# Patient Record
Sex: MALE | Race: WHITE | Employment: OTHER
[De-identification: names, ages, dates, MRNs, and addresses within clinical notes are randomized per-mention and may not be internally consistent; named-entity substitution may affect disease eponyms.]

---

## 2017-01-26 PROBLEM — IMO0002 OTHER SPECIFIED CAUSES OF URETHRAL STRICTURE: Status: ACTIVE | Noted: 2017-01-26

## 2017-01-26 PROBLEM — N13.30 HYDRONEPHROSIS, LEFT: Status: ACTIVE | Noted: 2017-01-26

## 2017-02-03 ENCOUNTER — SURGERY (OUTPATIENT)
Age: 82
End: 2017-02-03

## 2017-02-03 ENCOUNTER — HOSPITAL ENCOUNTER (OUTPATIENT)
Facility: HOSPITAL | Age: 82
Setting detail: HOSPITAL OUTPATIENT SURGERY
Discharge: HOME | End: 2017-02-03
Attending: UROLOGY | Admitting: UROLOGY
Payer: MEDICARE

## 2017-02-03 VITALS
BODY MASS INDEX: 21.86 KG/M2 | TEMPERATURE: 98 F | HEIGHT: 66 IN | OXYGEN SATURATION: 95 % | HEART RATE: 67 BPM | DIASTOLIC BLOOD PRESSURE: 60 MMHG | RESPIRATION RATE: 16 BRPM | WEIGHT: 136 LBS | SYSTOLIC BLOOD PRESSURE: 117 MMHG

## 2017-02-03 DIAGNOSIS — N35.919 URETHRAL STRICTURE: Primary | ICD-10-CM

## 2017-02-03 PROCEDURE — 0TJB8ZZ INSPECTION OF BLADDER, VIA NATURAL OR ARTIFICIAL OPENING ENDOSCOPIC: ICD-10-PCS | Performed by: UROLOGY

## 2017-02-03 PROCEDURE — BT0B0ZZ PLAIN RADIOGRAPHY OF BLADDER AND URETHRA USING HIGH OSMOLAR CONTRAST: ICD-10-PCS | Performed by: UROLOGY

## 2017-02-03 RX ORDER — HYDROMORPHONE HYDROCHLORIDE 1 MG/ML
0.4 INJECTION, SOLUTION INTRAMUSCULAR; INTRAVENOUS; SUBCUTANEOUS EVERY 5 MIN PRN
Status: DISCONTINUED | OUTPATIENT
Start: 2017-02-03 | End: 2017-02-03

## 2017-02-03 RX ORDER — HYDROCODONE BITARTRATE AND ACETAMINOPHEN 5; 325 MG/1; MG/1
2 TABLET ORAL AS NEEDED
Status: DISCONTINUED | OUTPATIENT
Start: 2017-02-03 | End: 2017-02-03

## 2017-02-03 RX ORDER — METOCLOPRAMIDE HYDROCHLORIDE 5 MG/ML
10 INJECTION INTRAMUSCULAR; INTRAVENOUS AS NEEDED
Status: DISCONTINUED | OUTPATIENT
Start: 2017-02-03 | End: 2017-02-03

## 2017-02-03 RX ORDER — CLINDAMYCIN PHOSPHATE 900 MG/50ML
INJECTION INTRAVENOUS
Status: DISCONTINUED | OUTPATIENT
Start: 2017-02-03 | End: 2017-02-03

## 2017-02-03 RX ORDER — ACETAMINOPHEN 500 MG
1000 TABLET ORAL ONCE AS NEEDED
Status: DISCONTINUED | OUTPATIENT
Start: 2017-02-03 | End: 2017-02-03

## 2017-02-03 RX ORDER — GENTAMICIN SULFATE 80 MG/100ML
INJECTION, SOLUTION INTRAVENOUS
Status: DISCONTINUED | OUTPATIENT
Start: 2017-02-03 | End: 2017-02-03

## 2017-02-03 RX ORDER — HYDROCODONE BITARTRATE AND ACETAMINOPHEN 5; 325 MG/1; MG/1
1 TABLET ORAL AS NEEDED
Status: DISCONTINUED | OUTPATIENT
Start: 2017-02-03 | End: 2017-02-03

## 2017-02-03 RX ORDER — CLINDAMYCIN PHOSPHATE 900 MG/50ML
900 INJECTION INTRAVENOUS ONCE
Status: DISCONTINUED | OUTPATIENT
Start: 2017-02-03 | End: 2017-02-03 | Stop reason: HOSPADM

## 2017-02-03 RX ORDER — HYDROCODONE BITARTRATE AND ACETAMINOPHEN 10; 325 MG/1; MG/1
1 TABLET ORAL EVERY 4 HOURS PRN
Qty: 30 TABLET | Refills: 1 | Status: SHIPPED | OUTPATIENT
Start: 2017-02-03 | End: 2017-02-13

## 2017-02-03 RX ORDER — SODIUM CHLORIDE, SODIUM LACTATE, POTASSIUM CHLORIDE, CALCIUM CHLORIDE 600; 310; 30; 20 MG/100ML; MG/100ML; MG/100ML; MG/100ML
INJECTION, SOLUTION INTRAVENOUS CONTINUOUS
Status: DISCONTINUED | OUTPATIENT
Start: 2017-02-03 | End: 2017-02-03

## 2017-02-03 RX ORDER — NALOXONE HYDROCHLORIDE 0.4 MG/ML
80 INJECTION, SOLUTION INTRAMUSCULAR; INTRAVENOUS; SUBCUTANEOUS AS NEEDED
Status: DISCONTINUED | OUTPATIENT
Start: 2017-02-03 | End: 2017-02-03

## 2017-02-03 RX ORDER — PHENAZOPYRIDINE HYDROCHLORIDE 100 MG/1
100 TABLET, FILM COATED ORAL 3 TIMES DAILY PRN
Qty: 15 TABLET | Refills: 1 | Status: SHIPPED | OUTPATIENT
Start: 2017-02-03 | End: 2017-02-13

## 2017-02-03 NOTE — OPERATIVE REPORT
[facility], formerly [facility]    PATIENT'S NAME: Orquidea Toney   ATTENDING PHYSICIAN: Dilma Grissom M.D. OPERATING PHYSICIAN: Dilma Grissom M.D.    PATIENT ACCOUNT#:   [de-identified]    [place]  [place] EDWP 10  MEDICAL RECORD #:   [id number]       DATE OF time.  Next, I palpated the patient's bladder. It did not appear distended making suprapubic tube placement dangerous, given the fact the patient has a history of abdominal surgery. As result, I elected to abort as the patient has been able to void.   At

## 2017-02-03 NOTE — BRIEF OP NOTE
[facility] SURGERY  Brief Op Note     Leena Martin Location: OR   CSN [id number] MRN [id number]   Admission Date 2/3/2017 Operation Date 2/3/2017   Attending Physician Jose Lopez MD Operating Physician Emanuel Silva MD       Pre-Operative [facility] Samples

## 2017-02-03 NOTE — H&P
Simona Snow MD at 1/26/2017 11:28 AM      Status: Signed : Simona Snow MD (Physician)     Expand All Collapse All        Reason for Visit    Pre-Op Exam        History of Present Zenaida Lantigua is a 80year old male presenting for a preoperativ WITH PHACOEMULSIFICATION WITH POSTERIOR CHAMBER LENS IMPLANTATION;  Surgeon:  Mitch Mondragon MD;  Location: [facility], [facility]     EYE SURG ANT [facility] PROC UNLISTED  Right  2/18/2015      Comment  Procedure: RIGHT LASER-ASSISTED CATARACT SURGERY WITH UPS          Review of Systems    CONSTITUTIONAL: feels well, no fever, night sweats, or fatigue  DERMATOLOGIC: denies skin lesions or rashes  OPHTHALMOLOGIC: no blurred or double vision  OTOLARYNGOLOGIC: denies sinus congestion or sore throat; no noted he full sentences; no tachypnea or retractions; no stridor; lungs clear to percussion and auscultation with no prolongation of expiratory phase  CARDIOVASCULAR: S1 normal, S2 physiologically split, regular rhythm; no gallop, murmur, or rub  ABDOMEN: non-diste

## 2017-04-28 PROCEDURE — 83090 ASSAY OF HOMOCYSTEINE: CPT | Performed by: INTERNAL MEDICINE

## 2017-04-28 PROCEDURE — 82607 VITAMIN B-12: CPT | Performed by: INTERNAL MEDICINE

## 2017-04-28 PROCEDURE — 84165 PROTEIN E-PHORESIS SERUM: CPT | Performed by: INTERNAL MEDICINE

## 2017-04-28 PROCEDURE — 83883 ASSAY NEPHELOMETRY NOT SPEC: CPT | Performed by: INTERNAL MEDICINE

## 2017-04-28 PROCEDURE — 86334 IMMUNOFIX E-PHORESIS SERUM: CPT | Performed by: INTERNAL MEDICINE

## 2017-04-28 PROCEDURE — 82746 ASSAY OF FOLIC ACID SERUM: CPT | Performed by: INTERNAL MEDICINE

## 2017-04-28 PROCEDURE — 83921 ORGANIC ACID SINGLE QUANT: CPT | Performed by: INTERNAL MEDICINE

## 2017-07-24 PROCEDURE — 84403 ASSAY OF TOTAL TESTOSTERONE: CPT | Performed by: UROLOGY

## 2017-08-17 ENCOUNTER — APPOINTMENT (OUTPATIENT)
Dept: LAB | Age: 82
End: 2017-08-17
Attending: DERMATOLOGY
Payer: MEDICARE

## 2017-08-17 DIAGNOSIS — D48.5 NEOPLASM OF UNCERTAIN BEHAVIOR OF SKIN: ICD-10-CM

## 2017-08-17 PROCEDURE — 88342 IMHCHEM/IMCYTCHM 1ST ANTB: CPT

## 2017-08-31 PROBLEM — C43.61: Status: ACTIVE | Noted: 2017-08-31

## 2017-09-19 PROCEDURE — 88307 TISSUE EXAM BY PATHOLOGIST: CPT | Performed by: SURGERY

## 2017-09-19 PROCEDURE — 88305 TISSUE EXAM BY PATHOLOGIST: CPT | Performed by: SURGERY

## 2017-09-19 PROCEDURE — 88342 IMHCHEM/IMCYTCHM 1ST ANTB: CPT | Performed by: SURGERY

## 2017-09-19 PROCEDURE — 88341 IMHCHEM/IMCYTCHM EA ADD ANTB: CPT | Performed by: SURGERY

## 2017-10-16 PROCEDURE — 87075 CULTR BACTERIA EXCEPT BLOOD: CPT | Performed by: SURGERY

## 2017-10-16 PROCEDURE — 87147 CULTURE TYPE IMMUNOLOGIC: CPT | Performed by: SURGERY

## 2017-10-16 PROCEDURE — 87186 SC STD MICRODIL/AGAR DIL: CPT | Performed by: SURGERY

## 2017-10-16 PROCEDURE — 87070 CULTURE OTHR SPECIMN AEROBIC: CPT | Performed by: SURGERY

## 2017-10-16 PROCEDURE — 87205 SMEAR GRAM STAIN: CPT | Performed by: SURGERY

## 2018-03-02 PROBLEM — D70.8 OTHER NEUTROPENIA (HCC): Status: ACTIVE | Noted: 2018-03-02

## 2018-05-15 PROCEDURE — 81015 MICROSCOPIC EXAM OF URINE: CPT | Performed by: UROLOGY

## 2018-05-15 PROCEDURE — 87086 URINE CULTURE/COLONY COUNT: CPT | Performed by: UROLOGY

## 2019-02-05 PROBLEM — N13.30 HYDRONEPHROSIS, LEFT: Status: RESOLVED | Noted: 2017-01-26 | Resolved: 2019-02-05

## 2019-02-05 PROBLEM — IMO0002 OTHER SPECIFIED CAUSES OF URETHRAL STRICTURE: Status: RESOLVED | Noted: 2017-01-26 | Resolved: 2019-02-05

## 2019-05-21 PROBLEM — E83.52 HYPERCALCEMIA: Status: ACTIVE | Noted: 2019-05-21

## 2019-05-21 PROBLEM — D64.9 ANEMIA, UNSPECIFIED TYPE: Status: ACTIVE | Noted: 2019-05-21

## 2019-05-21 PROBLEM — R79.89 ELEVATED SERUM CREATININE: Status: ACTIVE | Noted: 2019-05-21

## 2019-06-12 PROCEDURE — 84165 PROTEIN E-PHORESIS SERUM: CPT | Performed by: INTERNAL MEDICINE

## 2019-06-12 PROCEDURE — 86334 IMMUNOFIX E-PHORESIS SERUM: CPT | Performed by: INTERNAL MEDICINE

## 2019-06-12 PROCEDURE — 83883 ASSAY NEPHELOMETRY NOT SPEC: CPT | Performed by: INTERNAL MEDICINE

## 2019-08-21 PROCEDURE — 86618 LYME DISEASE ANTIBODY: CPT | Performed by: OTHER

## 2019-08-21 PROCEDURE — 84425 ASSAY OF VITAMIN B-1: CPT | Performed by: OTHER

## 2019-08-21 PROCEDURE — 83921 ORGANIC ACID SINGLE QUANT: CPT | Performed by: OTHER

## 2019-09-09 PROBLEM — I70.0 AORTO-ILIAC ATHEROSCLEROSIS (HCC): Status: ACTIVE | Noted: 2019-09-09

## 2019-09-09 PROBLEM — I70.8 AORTO-ILIAC ATHEROSCLEROSIS (HCC): Status: ACTIVE | Noted: 2019-09-09

## 2019-09-09 PROBLEM — I70.8 AORTO-ILIAC ATHEROSCLEROSIS: Status: ACTIVE | Noted: 2019-09-09

## 2019-09-09 PROBLEM — I73.9 PERIPHERAL VASCULAR DISEASE (HCC): Status: ACTIVE | Noted: 2019-09-09

## 2019-09-09 PROBLEM — N18.30 CKD (CHRONIC KIDNEY DISEASE) STAGE 3, GFR 30-59 ML/MIN (HCC): Status: ACTIVE | Noted: 2019-09-09

## 2019-09-09 PROBLEM — I70.0 AORTO-ILIAC ATHEROSCLEROSIS: Status: ACTIVE | Noted: 2019-09-09

## 2019-11-08 PROBLEM — R26.9 GAIT DISTURBANCE: Status: ACTIVE | Noted: 2019-11-08

## 2019-11-08 PROBLEM — G91.2 NORMAL PRESSURE HYDROCEPHALUS (HCC): Status: ACTIVE | Noted: 2019-11-08

## 2019-11-08 PROBLEM — I70.8 AORTO-ILIAC ATHEROSCLEROSIS: Status: RESOLVED | Noted: 2019-09-09 | Resolved: 2019-11-08

## 2019-11-08 PROBLEM — I70.8 AORTO-ILIAC ATHEROSCLEROSIS (HCC): Status: RESOLVED | Noted: 2019-09-09 | Resolved: 2019-11-08

## 2019-11-08 PROBLEM — I73.9 PERIPHERAL VASCULAR DISEASE (HCC): Status: RESOLVED | Noted: 2019-09-09 | Resolved: 2019-11-08

## 2019-11-08 PROBLEM — N18.30 CKD (CHRONIC KIDNEY DISEASE) STAGE 3, GFR 30-59 ML/MIN (HCC): Status: RESOLVED | Noted: 2019-09-09 | Resolved: 2019-11-08

## 2019-11-08 PROBLEM — I70.0 AORTO-ILIAC ATHEROSCLEROSIS (HCC): Status: RESOLVED | Noted: 2019-09-09 | Resolved: 2019-11-08

## 2019-11-08 PROBLEM — I15.9 SECONDARY HYPERTENSION: Status: ACTIVE | Noted: 2019-11-08

## 2019-11-08 PROBLEM — N13.30 HYDRONEPHROSIS, UNSPECIFIED HYDRONEPHROSIS TYPE: Status: ACTIVE | Noted: 2017-01-26

## 2019-11-08 PROBLEM — I70.0 AORTO-ILIAC ATHEROSCLEROSIS: Status: RESOLVED | Noted: 2019-09-09 | Resolved: 2019-11-08

## 2019-11-30 ENCOUNTER — APPOINTMENT (OUTPATIENT)
Dept: GENERAL RADIOLOGY | Facility: HOSPITAL | Age: 84
DRG: 391 | End: 2019-11-30
Attending: EMERGENCY MEDICINE
Payer: MEDICARE

## 2019-11-30 ENCOUNTER — APPOINTMENT (OUTPATIENT)
Dept: NUCLEAR MEDICINE | Facility: HOSPITAL | Age: 84
DRG: 391 | End: 2019-11-30
Attending: INTERNAL MEDICINE
Payer: MEDICARE

## 2019-11-30 ENCOUNTER — APPOINTMENT (OUTPATIENT)
Dept: ULTRASOUND IMAGING | Facility: HOSPITAL | Age: 84
DRG: 391 | End: 2019-11-30
Attending: INTERNAL MEDICINE
Payer: MEDICARE

## 2019-11-30 ENCOUNTER — HOSPITAL ENCOUNTER (INPATIENT)
Facility: HOSPITAL | Age: 84
LOS: 6 days | Discharge: HOME | DRG: 391 | End: 2019-12-06
Attending: EMERGENCY MEDICINE | Admitting: INTERNAL MEDICINE
Payer: MEDICARE

## 2019-11-30 DIAGNOSIS — J18.9 PNEUMONIA OF BOTH LOWER LOBES DUE TO INFECTIOUS ORGANISM: Primary | ICD-10-CM

## 2019-11-30 PROCEDURE — 93010 ELECTROCARDIOGRAM REPORT: CPT

## 2019-11-30 PROCEDURE — 87040 BLOOD CULTURE FOR BACTERIA: CPT | Performed by: EMERGENCY MEDICINE

## 2019-11-30 PROCEDURE — 87486 CHLMYD PNEUM DNA AMP PROBE: CPT | Performed by: INTERNAL MEDICINE

## 2019-11-30 PROCEDURE — 99285 EMERGENCY DEPT VISIT HI MDM: CPT

## 2019-11-30 PROCEDURE — 82728 ASSAY OF FERRITIN: CPT | Performed by: INTERNAL MEDICINE

## 2019-11-30 PROCEDURE — 82803 BLOOD GASES ANY COMBINATION: CPT | Performed by: EMERGENCY MEDICINE

## 2019-11-30 PROCEDURE — 80053 COMPREHEN METABOLIC PANEL: CPT | Performed by: EMERGENCY MEDICINE

## 2019-11-30 PROCEDURE — 36415 COLL VENOUS BLD VENIPUNCTURE: CPT

## 2019-11-30 PROCEDURE — 82607 VITAMIN B-12: CPT | Performed by: INTERNAL MEDICINE

## 2019-11-30 PROCEDURE — 83605 ASSAY OF LACTIC ACID: CPT | Performed by: EMERGENCY MEDICINE

## 2019-11-30 PROCEDURE — 82375 ASSAY CARBOXYHB QUANT: CPT | Performed by: EMERGENCY MEDICINE

## 2019-11-30 PROCEDURE — 84145 PROCALCITONIN (PCT): CPT | Performed by: INTERNAL MEDICINE

## 2019-11-30 PROCEDURE — 85379 FIBRIN DEGRADATION QUANT: CPT | Performed by: INTERNAL MEDICINE

## 2019-11-30 PROCEDURE — 87633 RESP VIRUS 12-25 TARGETS: CPT | Performed by: INTERNAL MEDICINE

## 2019-11-30 PROCEDURE — 85018 HEMOGLOBIN: CPT | Performed by: EMERGENCY MEDICINE

## 2019-11-30 PROCEDURE — 85045 AUTOMATED RETICULOCYTE COUNT: CPT | Performed by: INTERNAL MEDICINE

## 2019-11-30 PROCEDURE — 87798 DETECT AGENT NOS DNA AMP: CPT | Performed by: INTERNAL MEDICINE

## 2019-11-30 PROCEDURE — 93005 ELECTROCARDIOGRAM TRACING: CPT

## 2019-11-30 PROCEDURE — 87999 UNLISTED MICROBIOLOGY PX: CPT

## 2019-11-30 PROCEDURE — 83050 HGB METHEMOGLOBIN QUAN: CPT | Performed by: EMERGENCY MEDICINE

## 2019-11-30 PROCEDURE — 96374 THER/PROPH/DIAG INJ IV PUSH: CPT

## 2019-11-30 PROCEDURE — 87581 M.PNEUMON DNA AMP PROBE: CPT | Performed by: INTERNAL MEDICINE

## 2019-11-30 PROCEDURE — 71046 X-RAY EXAM CHEST 2 VIEWS: CPT | Performed by: EMERGENCY MEDICINE

## 2019-11-30 PROCEDURE — 78582 LUNG VENTILAT&PERFUS IMAGING: CPT | Performed by: INTERNAL MEDICINE

## 2019-11-30 PROCEDURE — 36600 WITHDRAWAL OF ARTERIAL BLOOD: CPT | Performed by: EMERGENCY MEDICINE

## 2019-11-30 PROCEDURE — 84132 ASSAY OF SERUM POTASSIUM: CPT | Performed by: EMERGENCY MEDICINE

## 2019-11-30 PROCEDURE — 83540 ASSAY OF IRON: CPT | Performed by: INTERNAL MEDICINE

## 2019-11-30 PROCEDURE — 83550 IRON BINDING TEST: CPT | Performed by: INTERNAL MEDICINE

## 2019-11-30 PROCEDURE — 82330 ASSAY OF CALCIUM: CPT | Performed by: EMERGENCY MEDICINE

## 2019-11-30 PROCEDURE — 85025 COMPLETE CBC W/AUTO DIFF WBC: CPT | Performed by: EMERGENCY MEDICINE

## 2019-11-30 PROCEDURE — 84295 ASSAY OF SERUM SODIUM: CPT | Performed by: EMERGENCY MEDICINE

## 2019-11-30 PROCEDURE — 93970 EXTREMITY STUDY: CPT | Performed by: INTERNAL MEDICINE

## 2019-11-30 RX ORDER — LABETALOL 200 MG/1
200 TABLET, FILM COATED ORAL 2 TIMES DAILY
Status: DISCONTINUED | OUTPATIENT
Start: 2019-11-30 | End: 2019-11-30

## 2019-11-30 RX ORDER — ENOXAPARIN SODIUM 100 MG/ML
40 INJECTION SUBCUTANEOUS DAILY
Status: DISCONTINUED | OUTPATIENT
Start: 2019-11-30 | End: 2019-12-01

## 2019-11-30 RX ORDER — SODIUM CHLORIDE 9 MG/ML
INJECTION, SOLUTION INTRAVENOUS CONTINUOUS
Status: DISCONTINUED | OUTPATIENT
Start: 2019-11-30 | End: 2019-12-01

## 2019-11-30 RX ORDER — IPRATROPIUM BROMIDE AND ALBUTEROL SULFATE 2.5; .5 MG/3ML; MG/3ML
3 SOLUTION RESPIRATORY (INHALATION) EVERY 4 HOURS PRN
Status: DISCONTINUED | OUTPATIENT
Start: 2019-11-30 | End: 2019-12-06

## 2019-11-30 RX ORDER — TAMSULOSIN HYDROCHLORIDE 0.4 MG/1
0.4 CAPSULE ORAL DAILY
COMMUNITY
End: 2020-04-06

## 2019-11-30 RX ORDER — LISINOPRIL 10 MG/1
10 TABLET ORAL DAILY
Status: DISCONTINUED | OUTPATIENT
Start: 2019-12-01 | End: 2019-12-06

## 2019-11-30 RX ORDER — ATORVASTATIN CALCIUM 20 MG/1
20 TABLET, FILM COATED ORAL NIGHTLY
Status: DISCONTINUED | OUTPATIENT
Start: 2019-11-30 | End: 2019-12-06

## 2019-11-30 RX ORDER — HYDRALAZINE HYDROCHLORIDE 20 MG/ML
10 INJECTION INTRAMUSCULAR; INTRAVENOUS EVERY 6 HOURS PRN
Status: DISCONTINUED | OUTPATIENT
Start: 2019-11-30 | End: 2019-12-06

## 2019-11-30 RX ORDER — ASPIRIN 81 MG/1
81 TABLET ORAL DAILY
Status: DISCONTINUED | OUTPATIENT
Start: 2019-11-30 | End: 2019-12-06

## 2019-11-30 RX ORDER — ALFUZOSIN HYDROCHLORIDE 10 MG/1
10 TABLET, EXTENDED RELEASE ORAL
Status: DISCONTINUED | OUTPATIENT
Start: 2019-12-01 | End: 2019-12-06

## 2019-11-30 RX ORDER — ACETAMINOPHEN 325 MG/1
650 TABLET ORAL EVERY 6 HOURS PRN
Status: DISCONTINUED | OUTPATIENT
Start: 2019-11-30 | End: 2019-12-06

## 2019-11-30 RX ORDER — BICALUTAMIDE 50 MG/1
50 TABLET, FILM COATED ORAL DAILY
COMMUNITY
End: 2020-04-06

## 2019-11-30 RX ORDER — LABETALOL 200 MG/1
200 TABLET, FILM COATED ORAL EVERY 8 HOURS SCHEDULED
Status: DISCONTINUED | OUTPATIENT
Start: 2019-11-30 | End: 2019-12-04

## 2019-11-30 RX ORDER — LABETALOL 200 MG/1
200 TABLET, FILM COATED ORAL 2 TIMES DAILY
Status: ON HOLD | COMMUNITY
End: 2019-12-06

## 2019-11-30 RX ORDER — SIMVASTATIN 40 MG
40 TABLET ORAL NIGHTLY
COMMUNITY
End: 2020-03-09

## 2019-11-30 RX ORDER — BICALUTAMIDE 50 MG/1
50 TABLET, FILM COATED ORAL DAILY
Status: DISCONTINUED | OUTPATIENT
Start: 2019-11-30 | End: 2019-12-06

## 2019-11-30 RX ORDER — HYDRALAZINE HYDROCHLORIDE 20 MG/ML
10 INJECTION INTRAMUSCULAR; INTRAVENOUS ONCE
Status: COMPLETED | OUTPATIENT
Start: 2019-11-30 | End: 2019-11-30

## 2019-11-30 NOTE — PROGRESS NOTES
NURSING ADMISSION NOTE      Patient admitted via Cart  Oriented to room. Safety precautions initiated. Bed in low position. Call light in reach. Received pt from ER. Admission navigator completed. VSS. Maintaining 02 sats on 4L per nasal cannula.

## 2019-11-30 NOTE — ED PROVIDER NOTES
Patient Seen in: [facility] Emergency Department      History   Patient presents with:  Dyspnea MARCELA SOB (respiratory)    Stated Complaint: MARCELA    HPI    Patient here for evaluation of cough and shortness of breath.   Started 3 to 4 days ago and has b Quit date: 1966        Years since quittin.8      Smokeless tobacco: Never Used      Tobacco comment: quit 40 years ago    Alcohol use: Yes      Comment: 3-4x a week 1-2 drinks    Drug use:  No             Review of Systems    Positive for stated (*)     Total Hemoglobin 9.7 (*)     Potassium Blood Gas 3.3 (*)     Sodium Blood Gas 147 (*)     All other components within normal limits   CBC W/ DIFFERENTIAL - Abnormal; Notable for the following components:    RBC 3.23 (*)     HGB 9.6 (*)     HCT 29. 7 read.    ABG noted, notable for hypoxemia. Low bicarb noted. Lactic acid is 1.4. CMP is fine, elevation creatinine is chronic. Hemoglobin is 9.6. Blood cultures drawn. Rocephin, azithromycin. Patient was noted be hypoxic 86% on room air.

## 2019-11-30 NOTE — ED INITIAL ASSESSMENT (HPI)
A/o x3, ambulatory, pt went to IC today after calling his PCP and telling him he felt SOB, also with nasal congestion and cough now x3 days. Denies fevers or chills, per pt, oxygen level was low at 90% at IC today.

## 2019-11-30 NOTE — H&P
[facility] Hospitalist Team  History and Physical       Assessment/Plan:     #SOB/hypoxia  -check RVP  -possible PNA on chest xr but not typical PNA symptoms  -continue empiric antibiotics  -check procal and dimer  -O2 as needed  -deven prn    #Prostate cancer- WITH PHACOEMULSIFICATION WITH POSTERIOR CHAMBER LENS IMPLANTATION Right 2/18/2015    Performed by Brigid Ott MD at [address] [facility] Right 9/19/2017    Performed by Camila Oconnell MD at [address] [facility] Supple, symmetrical, trachea midline   Lungs:   Some rales BL. Normal effort   Chest wall:  No tenderness or deformity   Heart:  Regular rate and rhythm, S1, S2 normal, no murmur, rub or gallop appreciated   Abdomen:   Soft, non-tender.  Bowel sounds normal CONTRAST INDICATION: Dementia, bilateral weakness for 3 months, difficulty walking COMPARISON: None. TECHNIQUE: Sagittal and axial T1, axial T2, axial FLAIR, axial T2 GRE, and axial DWI sequences were obtained through the brain.  No intravenous contrast was Certification    Patient will require inpatient services that will reasonably be expected to span two midnight's based on the clinical documentation in H+P.    Based on patients current state of illness, I anticipate that, after discharge, patient will requ

## 2019-12-01 ENCOUNTER — APPOINTMENT (OUTPATIENT)
Dept: CV DIAGNOSTICS | Facility: HOSPITAL | Age: 84
DRG: 391 | End: 2019-12-01
Attending: INTERNAL MEDICINE
Payer: MEDICARE

## 2019-12-01 ENCOUNTER — APPOINTMENT (OUTPATIENT)
Dept: CT IMAGING | Facility: HOSPITAL | Age: 84
DRG: 391 | End: 2019-12-01
Attending: INTERNAL MEDICINE
Payer: MEDICARE

## 2019-12-01 PROCEDURE — 82747 ASSAY OF FOLIC ACID RBC: CPT | Performed by: INTERNAL MEDICINE

## 2019-12-01 PROCEDURE — 87449 NOS EACH ORGANISM AG IA: CPT | Performed by: INTERNAL MEDICINE

## 2019-12-01 PROCEDURE — 94640 AIRWAY INHALATION TREATMENT: CPT

## 2019-12-01 PROCEDURE — 93306 TTE W/DOPPLER COMPLETE: CPT | Performed by: INTERNAL MEDICINE

## 2019-12-01 PROCEDURE — 85025 COMPLETE CBC W/AUTO DIFF WBC: CPT | Performed by: INTERNAL MEDICINE

## 2019-12-01 PROCEDURE — 83880 ASSAY OF NATRIURETIC PEPTIDE: CPT | Performed by: INTERNAL MEDICINE

## 2019-12-01 PROCEDURE — 80048 BASIC METABOLIC PNL TOTAL CA: CPT | Performed by: INTERNAL MEDICINE

## 2019-12-01 PROCEDURE — 84132 ASSAY OF SERUM POTASSIUM: CPT | Performed by: INTERNAL MEDICINE

## 2019-12-01 PROCEDURE — 85014 HEMATOCRIT: CPT | Performed by: INTERNAL MEDICINE

## 2019-12-01 PROCEDURE — 71250 CT THORAX DX C-: CPT | Performed by: INTERNAL MEDICINE

## 2019-12-01 RX ORDER — AMLODIPINE BESYLATE 5 MG/1
5 TABLET ORAL DAILY
Status: DISCONTINUED | OUTPATIENT
Start: 2019-12-01 | End: 2019-12-04

## 2019-12-01 RX ORDER — POTASSIUM CHLORIDE 20 MEQ/1
40 TABLET, EXTENDED RELEASE ORAL ONCE
Status: COMPLETED | OUTPATIENT
Start: 2019-12-01 | End: 2019-12-01

## 2019-12-01 RX ORDER — IPRATROPIUM BROMIDE AND ALBUTEROL SULFATE 2.5; .5 MG/3ML; MG/3ML
3 SOLUTION RESPIRATORY (INHALATION)
Status: DISCONTINUED | OUTPATIENT
Start: 2019-12-01 | End: 2019-12-04

## 2019-12-01 RX ORDER — ENOXAPARIN SODIUM 100 MG/ML
30 INJECTION SUBCUTANEOUS DAILY
Status: DISCONTINUED | OUTPATIENT
Start: 2019-12-02 | End: 2019-12-06

## 2019-12-01 RX ORDER — DEXTROSE MONOHYDRATE 50 MG/ML
INJECTION, SOLUTION INTRAVENOUS CONTINUOUS
Status: DISCONTINUED | OUTPATIENT
Start: 2019-12-01 | End: 2019-12-02

## 2019-12-01 NOTE — PROGRESS NOTES
[facility] Pharmacy Note:  Renal Dose Adjustment for Enoxaparin (LOVENOX)    Joel Alicia has been prescribed Enoxaparin (LOVENOX) 40 mg subcutaneously every 24 hours. Estimated Creatinine Clearance: 21.2 mL/min (A) (based on SCr of 2.22 mg/dL (H)).     His

## 2019-12-01 NOTE — CONSULTS
[facility]  Report of Consultation    Hettie Dubin Patient Status:  Inpatient    3/23/1934 MRN [id number]   [facility] 5NW-A Attending Rafa Morel, [address] Day # 1 PCP Virginia Kellogg MD     Impression and Plan:  1) Bilateral h at the bedside. He is very conscious of procedural complications because he describes: Perforation requiring exploratory laparotomy following colonoscopy. The patient did not want to do the S/P Tube  until I have a chance to talk to Dr. Familia Leon tomorrow. Nebulization, Q4H PRN  Labetalol HCl (NORMODYNE) tab 200 mg, 200 mg, Oral, Q8H STUART  hydrALAzine HCl (APRESOLINE) injection 10 mg, 10 mg, Intravenous, Q6H PRN        History:  Past Medical History:   Diagnosis Date   • Actinic keratitis    • BPH    • Exposu on file      Transportation needs:        Medical: Not on file        Non-medical: Not on file    Tobacco Use      Smoking status: Former Smoker        Packs/day: 2.00        Years: 20.00        Pack years: 40        Types: Cigars        Quit date: 1/24/19 10/11/19  KIDNEYS: As seen on the prior study, there is severe bilateral hydronephrosis. The urinary bladder  is distended and both ureters are diffusely dilated.  There also appear to be cysts in the kidneys,  incompletely evaluated on this exam without co

## 2019-12-01 NOTE — CONSULTS
Pulmonary / Critical Care H&P/Consult       NAME: [id number] [place]: 270/264-S - MRN: [id number] - Age: 80year old - :  3/23/1934    Date of Admission: 2019 10:57 AM  Admission Diagnosis: Pneumonia of both lower lobes due to infectious organi Spouse name: Not on file      Number of children: Not on file      Years of education: Not on file      Highest education level: Not on file    Occupational History      Not on file    Social Needs      Financial resource strain: Not on file      Food inse daily., Disp: , Rfl:   Labetalol HCl 200 MG Oral Tab, Take 200 mg by mouth 2 (two) times daily. , Disp: , Rfl:   tamsulosin HCl 0.4 MG Oral Cap, Take 0.4 mg by mouth daily.   , Disp: , Rfl:   simvastatin 40 MG Oral Tab, Take 40 mg by mouth nightly., Disp: , (61.7 kg)   SpO2 94%   BMI 21.95 kg/m²     General Appearance:    Alert, cooperative, no distress, appears stated age   Head:    Normocephalic, without obvious abnormality, atraumatic   Eyes:    PERRL, conjunctiva/corneas clear   Neck:   Supple, symmetrica cx. Urinary strep / legionella ordered as well, and sputum cx.      Will follow          Carlos Marshall M.D.  [facility] Pulmonary / [address]  12/1/2019

## 2019-12-01 NOTE — PROGRESS NOTES
12/01/19 1114   Provider Notification   Reason for Communication Review case  (bladder scan >999)   Provider Name Agustina Motta MD   Method of Communication Page   Response Waiting for response   Notification Time

## 2019-12-01 NOTE — PLAN OF CARE
Problem: Patient/Family Goals  Goal: Patient/Family Long Term Goal  Description  Patient's Long Term Goal: Health management     Interventions:  - MAR  - Patient education   - activity as tolerated  - medications as prescribed.    - See additional Care Pl

## 2019-12-02 ENCOUNTER — ANESTHESIA EVENT (OUTPATIENT)
Dept: SURGERY | Facility: HOSPITAL | Age: 84
DRG: 391 | End: 2019-12-02
Payer: MEDICARE

## 2019-12-02 ENCOUNTER — ANESTHESIA (OUTPATIENT)
Dept: SURGERY | Facility: HOSPITAL | Age: 84
DRG: 391 | End: 2019-12-02
Payer: MEDICARE

## 2019-12-02 ENCOUNTER — APPOINTMENT (OUTPATIENT)
Dept: GENERAL RADIOLOGY | Facility: HOSPITAL | Age: 84
DRG: 391 | End: 2019-12-02
Attending: UROLOGY
Payer: MEDICARE

## 2019-12-02 PROCEDURE — 94640 AIRWAY INHALATION TREATMENT: CPT

## 2019-12-02 PROCEDURE — 85610 PROTHROMBIN TIME: CPT | Performed by: HOSPITALIST

## 2019-12-02 PROCEDURE — 80048 BASIC METABOLIC PNL TOTAL CA: CPT | Performed by: INTERNAL MEDICINE

## 2019-12-02 PROCEDURE — 85025 COMPLETE CBC W/AUTO DIFF WBC: CPT | Performed by: INTERNAL MEDICINE

## 2019-12-02 PROCEDURE — BT141ZZ FLUOROSCOPY OF KIDNEYS, URETERS AND BLADDER USING LOW OSMOLAR CONTRAST: ICD-10-PCS | Performed by: UROLOGY

## 2019-12-02 PROCEDURE — 94664 DEMO&/EVAL PT USE INHALER: CPT

## 2019-12-02 RX ORDER — TRAZODONE HYDROCHLORIDE 50 MG/1
50 TABLET ORAL NIGHTLY PRN
Status: DISCONTINUED | OUTPATIENT
Start: 2019-12-02 | End: 2019-12-06

## 2019-12-02 RX ORDER — PHENYLEPHRINE HCL 10 MG/ML
VIAL (ML) INJECTION AS NEEDED
Status: DISCONTINUED | OUTPATIENT
Start: 2019-12-02 | End: 2019-12-02 | Stop reason: SURG

## 2019-12-02 RX ORDER — HYDROCODONE BITARTRATE AND ACETAMINOPHEN 5; 325 MG/1; MG/1
1 TABLET ORAL AS NEEDED
Status: DISCONTINUED | OUTPATIENT
Start: 2019-12-02 | End: 2019-12-02 | Stop reason: HOSPADM

## 2019-12-02 RX ORDER — ALBUTEROL SULFATE 90 UG/1
AEROSOL, METERED RESPIRATORY (INHALATION) AS NEEDED
Status: DISCONTINUED | OUTPATIENT
Start: 2019-12-02 | End: 2019-12-02 | Stop reason: SURG

## 2019-12-02 RX ORDER — MEPERIDINE HYDROCHLORIDE 25 MG/ML
12.5 INJECTION INTRAMUSCULAR; INTRAVENOUS; SUBCUTANEOUS AS NEEDED
Status: DISCONTINUED | OUTPATIENT
Start: 2019-12-02 | End: 2019-12-02 | Stop reason: HOSPADM

## 2019-12-02 RX ORDER — FUROSEMIDE 10 MG/ML
40 INJECTION INTRAMUSCULAR; INTRAVENOUS ONCE
Status: DISCONTINUED | OUTPATIENT
Start: 2019-12-02 | End: 2019-12-02

## 2019-12-02 RX ORDER — MORPHINE SULFATE 4 MG/ML
2 INJECTION, SOLUTION INTRAMUSCULAR; INTRAVENOUS EVERY 5 MIN PRN
Status: DISCONTINUED | OUTPATIENT
Start: 2019-12-02 | End: 2019-12-02 | Stop reason: HOSPADM

## 2019-12-02 RX ORDER — METOPROLOL TARTRATE 5 MG/5ML
5 INJECTION INTRAVENOUS EVERY 6 HOURS
Status: DISCONTINUED | OUTPATIENT
Start: 2019-12-02 | End: 2019-12-04

## 2019-12-02 RX ORDER — NALOXONE HYDROCHLORIDE 0.4 MG/ML
80 INJECTION, SOLUTION INTRAMUSCULAR; INTRAVENOUS; SUBCUTANEOUS AS NEEDED
Status: DISCONTINUED | OUTPATIENT
Start: 2019-12-02 | End: 2019-12-02 | Stop reason: HOSPADM

## 2019-12-02 RX ORDER — SODIUM CHLORIDE, SODIUM LACTATE, POTASSIUM CHLORIDE, CALCIUM CHLORIDE 600; 310; 30; 20 MG/100ML; MG/100ML; MG/100ML; MG/100ML
INJECTION, SOLUTION INTRAVENOUS CONTINUOUS
Status: DISCONTINUED | OUTPATIENT
Start: 2019-12-02 | End: 2019-12-02 | Stop reason: HOSPADM

## 2019-12-02 RX ORDER — HYDROMORPHONE HYDROCHLORIDE 1 MG/ML
0.4 INJECTION, SOLUTION INTRAMUSCULAR; INTRAVENOUS; SUBCUTANEOUS EVERY 5 MIN PRN
Status: DISCONTINUED | OUTPATIENT
Start: 2019-12-02 | End: 2019-12-02 | Stop reason: HOSPADM

## 2019-12-02 RX ORDER — LIDOCAINE HYDROCHLORIDE 10 MG/ML
INJECTION, SOLUTION EPIDURAL; INFILTRATION; INTRACAUDAL; PERINEURAL AS NEEDED
Status: DISCONTINUED | OUTPATIENT
Start: 2019-12-02 | End: 2019-12-02 | Stop reason: SURG

## 2019-12-02 RX ORDER — POTASSIUM CHLORIDE 20 MEQ/1
40 TABLET, EXTENDED RELEASE ORAL ONCE
Status: DISCONTINUED | OUTPATIENT
Start: 2019-12-02 | End: 2019-12-02

## 2019-12-02 RX ORDER — ONDANSETRON 2 MG/ML
4 INJECTION INTRAMUSCULAR; INTRAVENOUS AS NEEDED
Status: DISCONTINUED | OUTPATIENT
Start: 2019-12-02 | End: 2019-12-02 | Stop reason: HOSPADM

## 2019-12-02 RX ORDER — HYDROCODONE BITARTRATE AND ACETAMINOPHEN 5; 325 MG/1; MG/1
2 TABLET ORAL AS NEEDED
Status: DISCONTINUED | OUTPATIENT
Start: 2019-12-02 | End: 2019-12-02 | Stop reason: HOSPADM

## 2019-12-02 RX ORDER — FUROSEMIDE 10 MG/ML
40 INJECTION INTRAMUSCULAR; INTRAVENOUS ONCE
Status: COMPLETED | OUTPATIENT
Start: 2019-12-02 | End: 2019-12-02

## 2019-12-02 RX ORDER — HYDRALAZINE HYDROCHLORIDE 20 MG/ML
10 INJECTION INTRAMUSCULAR; INTRAVENOUS ONCE
Status: COMPLETED | OUTPATIENT
Start: 2019-12-02 | End: 2019-12-02

## 2019-12-02 RX ADMIN — PHENYLEPHRINE HCL 50 MCG: 10 MG/ML VIAL (ML) INJECTION at 19:31:00

## 2019-12-02 RX ADMIN — ALBUTEROL SULFATE 4 PUFF: 90 AEROSOL, METERED RESPIRATORY (INHALATION) at 19:36:00

## 2019-12-02 RX ADMIN — PHENYLEPHRINE HCL 50 MCG: 10 MG/ML VIAL (ML) INJECTION at 19:27:00

## 2019-12-02 RX ADMIN — PHENYLEPHRINE HCL 50 MCG: 10 MG/ML VIAL (ML) INJECTION at 19:24:00

## 2019-12-02 RX ADMIN — LIDOCAINE HYDROCHLORIDE 100 MG: 10 INJECTION, SOLUTION EPIDURAL; INFILTRATION; INTRACAUDAL; PERINEURAL at 19:04:00

## 2019-12-02 NOTE — PROGRESS NOTES
BP STILL NOTED /82, HR 59 AFTER DOSE OF HYDRALAZINE AT 1510PM AND LAST METOPROLOL DOSE AT 1302PM. DR. Arie Meyer PAGED. RECEIVED CALL BACK FROM DR. Ryley Devlin MD WAS NOTIFIED OF BP AND MEDICATIONS GIVEN.  MD WITH ORDERS TO GIVE ADDITIONAL 10MG IV HYDR

## 2019-12-02 NOTE — PLAN OF CARE
Problem: Patient/Family Goals  Goal: Patient/Family Long Term Goal  Description  Patient's Long Term Goal: Health management     Interventions:  - MAR  - Patient education   - activity as tolerated  - medications as prescribed.    - See additional Care Pl needed  - Follow urinary retention protocol/standard of care  - Consider collaborating with pharmacy to review patient's medication profile  - Implement strategies to promote bladder emptying  Outcome: Progressing

## 2019-12-02 NOTE — PROGRESS NOTES
[facility]  Urology Progress Note    Sunni Villela Patient Status:  Inpatient    3/23/1934 MRN [id number]   [facility] 5NW-A Attending Laurent Washburn, [address] [facility] Day # 2 PCP Patrizia Lange MD     Subjective:   Sunni Villela is a(n) 80 ye who understands and wishes to proceed. NPO  Consent to be signed  On abx  Lovenox/aspirin held today. Je Aguila P.A.-C  [facility] Urology  12/2/2019  8:58 AM    Addendum:  Cardiology consultation appreciated. Stable for  procedure.     Rj,

## 2019-12-02 NOTE — DIETARY NOTE
Sloane Kelly 53     Admitting diagnosis:  Pneumonia of both lower lobes due to infectious organism [J18.9]    Ht:  167.6 cm (5'6\")  Wt: 61.7 kg (136 lb). This is 96 % of IBW  Body mass index is 21.95 kg/m².   IBW: 64

## 2019-12-02 NOTE — PROGRESS NOTES
BP NOTED 165/68, HR 57. PT NPO FOR CYSTOSOCOPY TODAY. DR. Hernadez Scobey PAGED AND NOTIFIED. MD WITH ORDERS TO GIVE HYDRALAZINE PRN DOSE NOW SINCE PT'S UNABLE TO TAKE PO MEDS.

## 2019-12-02 NOTE — PROGRESS NOTES
[facility] Hospitalist Progress Note     PCP: Virginia Kellogg MD    SUBJECTIVE:  No CP, SOB, or N/V. Pt is feeling more congested. Hard time lying flat.     OBJECTIVE:  Temp:  [97.8 °F (36.6 °C)-98.1 °F (36.7 °C)] 97.8 °F (36.6 °C)  Pulse:  [57-61] 58  Resp:  [16- • bicalutamide  50 mg Oral Daily   • Labetalol HCl  200 mg Oral Q8H [address]       acetaminophen, ipratropium-albuterol, hydrALAzine HCl       Assessment/Plan:     Principal Problem:    Pneumonia of both lower lobes due to infectious organism      #SOB/hypoxi

## 2019-12-02 NOTE — CONSULTS
[facility] Cardiology Consultation    Zuleika Wilkerson Patient Status:  Inpatient    3/23/1934 MRN [id number]   [facility] 5NW-A Attending Brandt Gómez, [address] [facility] Day # 2 PCP Natalie Yoder MD     Reason for Consultation:  Dyspnea, heart failure HISTORY  1/10/17    Duo-   • RIGHT LASER-ASSISTED CATARACT SURGERY WITH PHACOEMULSIFICATION WITH POSTERIOR CHAMBER LENS IMPLANTATION Right 2/18/2015    Performed by Meryle Guarneri, MD at [address] [place] [address] diminished in bases. Abdomen: Soft, non-tender. Extremities: No edema  Neurologic: no focal deficits  Skin: Warm and dry. Telemetry: sinus    Laboratories and Data:  Diagnostics:    EKG, 12/2/2019:  NSR, PAC's, PVC's. NSSTW changes.     CXR, 12

## 2019-12-02 NOTE — PROGRESS NOTES
[facility] & [address] Patient Status:  Inpatient    3/23/1934 MRN [id number]   [facility] 5NW-A Attending Tena Litten, DO   Hosp Day # 2 PCP Lucretia Parks MD     SUBJECTIVE: Pt complains of chest congestion, continues to have d exudates, moist mucous membranes                          Lungs: diminished over bilateral bases                          Chest wall: No tenderness or deformity.                           ALUMJ: NBFHJWM rate and rhythm, normal S1S2                           placement with urology  3.  Preoperative pulmonary risk stratification:  -pt is at some increased risk for pulmonary complications following urologic intervention given current fluid overload state, although optimal management of respiratory status is depen

## 2019-12-03 ENCOUNTER — APPOINTMENT (OUTPATIENT)
Dept: GENERAL RADIOLOGY | Facility: HOSPITAL | Age: 84
DRG: 391 | End: 2019-12-03
Attending: INTERNAL MEDICINE
Payer: MEDICARE

## 2019-12-03 PROCEDURE — 84132 ASSAY OF SERUM POTASSIUM: CPT | Performed by: UROLOGY

## 2019-12-03 PROCEDURE — 94640 AIRWAY INHALATION TREATMENT: CPT

## 2019-12-03 PROCEDURE — 71045 X-RAY EXAM CHEST 1 VIEW: CPT | Performed by: INTERNAL MEDICINE

## 2019-12-03 RX ORDER — POTASSIUM CHLORIDE 20 MEQ/1
40 TABLET, EXTENDED RELEASE ORAL ONCE
Status: COMPLETED | OUTPATIENT
Start: 2019-12-03 | End: 2019-12-03

## 2019-12-03 RX ORDER — FUROSEMIDE 10 MG/ML
80 INJECTION INTRAMUSCULAR; INTRAVENOUS ONCE
Status: COMPLETED | OUTPATIENT
Start: 2019-12-03 | End: 2019-12-03

## 2019-12-03 RX ORDER — FUROSEMIDE 10 MG/ML
40 INJECTION INTRAMUSCULAR; INTRAVENOUS ONCE
Status: COMPLETED | OUTPATIENT
Start: 2019-12-03 | End: 2019-12-03

## 2019-12-03 NOTE — PROGRESS NOTES
[facility]  Urology Progress Note    Jennifer Ortiz Patient Status:  Inpatient    3/23/1934 MRN [id number]   [facility] 5NW-A Attending Heidi Prince, DO   Hosp Day # 3 PCP Lemuel Vázquez MD     Subjective:   Jennifer Ortiz is a(n) 80 ye

## 2019-12-03 NOTE — ANESTHESIA PREPROCEDURE EVALUATION
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]    Procedure(s):  CYSTOSCOPY, POSSIBLE URETHRAL DILATION, POSSIBLE SUPRA PUBIC TUBE PLACEMENT    Surgeon(s) and Role:     * Edinson Landry MD - Elizabeth Salter mg, Oral, Nightly  [MAR Hold] Alfuzosin HCl ER (UROXATRAL) 24 hr tab 10 mg, 10 mg, Oral, Daily with breakfast  [MAR Hold] bicalutamide (CASODEX) tab 50 mg, 50 mg, Oral, Daily  [MAR Hold] ipratropium-albuterol (DUONEB) nebulizer solution 3 mL, 3 mL, Nebuliz Malignant melanoma of skin of elbow, right (HCC)     Other neutropenia (HCC)     Elevated serum creatinine     Anemia, unspecified type     Hypercalcemia     Secondary hypertension     Gait disturbance     Pneumonia of both lower lobes due to infectious or 12/02/2019    MCH 28.9 11/08/2019    MCHC 32.4 12/02/2019    MCHC 30.9 (L) 11/08/2019    RDW 14.4 12/02/2019    RDW 13.0 11/08/2019    .0 (L) 12/02/2019     11/08/2019     Lab Results   Component Value Date     12/02/2019     11/0

## 2019-12-03 NOTE — HOME CARE LIAISON
Referral received from Matte[facility]. Met with pt and pt's wife to discuss home health care. Pt requesting for home health to come  to discharge, as pt would like to take some time to think about it and discuss with family. aMtteo notified.

## 2019-12-03 NOTE — PROGRESS NOTES
Problem: Fluid overload      Data: Pt A&Ox4 . 40L 100% FiO2 via Vapotherm . RA baseline. . Pt c/o sob, pt received neb treatments and IS at bedside. Hassan in place for retention after cysto. Pt denies c/o pain.  Up with standby assist. Tolerating diet at

## 2019-12-03 NOTE — ANESTHESIA PROCEDURE NOTES
Peripheral IV  Inserted by: Bettye Blakely MD    Placement  Needle size: 20 G  Laterality: right  Location: arm  Local anesthetic: none  Site prep: alcohol  Technique: anatomical landmarks  Attempts: 1

## 2019-12-03 NOTE — OPERATIVE REPORT
[facility] & [address] Patient Status:  Inpatient    3/23/1934 MRN [id number]   Location [facility] UNIT Attending Cori Arreguin, [address] Day # 2 PCP Sheba Whelan MD     Date of Operation:  2019    Procedure: C easily dilated with Merlin Lame sounds. The cystoscope was passed through again and there was an area of dilated stricture in the membranous urethra which was less than 1 cm in length.   The prostate showed minimal lateral lobe enlargement and was visually non

## 2019-12-03 NOTE — ANESTHESIA POSTPROCEDURE EVALUATION
[facility] & [address] Patient Status:  Inpatient   Age/Gender 80year old male MRN [id number]   Location [address] [facility] Attending Tio Kirby, [address] [facility] Day # 2 PCP Jennifer Qiu MD       Anesthesia Post-op Note    Pr

## 2019-12-03 NOTE — PLAN OF CARE
Problem: Patient/Family Goals  Goal: Patient/Family Long Term Goal  Description  Patient's Long Term Goal: Health management     Interventions:  - MAR  - Patient education   - activity as tolerated  - medications as prescribed.    - See additional Care Pl strategies to promote bladder emptying  Outcome: Progressing

## 2019-12-03 NOTE — PROGRESS NOTES
[facility] Cardiology Progress Note        Tammie Dias Patient Status:  Inpatient    3/23/1934 MRN [id number]   [facility] 5NW-A Attending Mckay Waldrop,    Hosp Day # 3 PCP Toyin Sousa MD     Subjective:  OR not 8.4*   .0 128.0* 117.0*       Chem:  Recent Labs   Lab 11/30/19  1121 12/01/19  0642 12/01/19  1245 12/02/19  0611 12/03/19  0554   * 148*  --  145  --    K 3.5 3.6 3.9 3.6 3.7   * 118*  --  115*  --    CO2 24.0 23.0  --  24.0  --    BUN

## 2019-12-03 NOTE — PROGRESS NOTES
[facility] & [address] Patient Status:  Inpatient    3/23/1934 MRN [id number]   [facility] 5NW-A Attending Luiz Bray, DO   Hosp Day # 3 PCP Elif Yang MD     SUBJECTIVE: Pt s/p cystoscopy with bilateral retrograde pyelogram  Physical Exam:                          PRCIPUP: TQVCJ, cooperative, in NAD                          HEENT: oropharynx clear without erythema or exudates, moist mucous membranes                          Lungs: bibasilar crackles

## 2019-12-03 NOTE — ANESTHESIA PROCEDURE NOTES
Airway  Urgency: elective      General Information and Staff    Patient location during procedure: OR  Anesthesiologist: Anabelle Jurado MD  Performed: anesthesiologist     Indications and Patient Condition  Indications for airway management: anesthesia  S

## 2019-12-03 NOTE — PLAN OF CARE
Patient Seen in: [facility] In [place]    History   Patient presents with:  Cough/URI  Back Pain (musculoskeletal)    Stated Complaint: cough/congestion 1 week    HPI    CHIEF COMPLAINT: Cough and congestion for the past 1 week     HISTORY Problem: Patient/Family Goals  Goal: Patient/Family Long Term Goal  Description  Patient's Long Term Goal: Health management     Interventions:  - MAR  - Patient education   - activity as tolerated  - medications as prescribed.    - See additional Care Pl dysfunction with inhibited sexual excitement    • Seizure disorder [facility])        Past Surgical History:  No date: OTHER SURGICAL HISTORY      Comment: acellular dermal replacement shoulder, right    Family History   Problem Relation Age of Onset   • Heart A emptying  Outcome: Progressing deficits. Cranial nerves are intact, 5 out of 5 symmetric upper and lower extremity motor strength. Gait normal.  Skin:  warm and dry, no rashes. No jaundice. Brisk capillary refill  Musculoskeletal: neck is supple, no lymphadenopathy, non tender.  no men COMPARISON:  DICK REED CHEST PA + LAT CHEST (LGI=[id number]), taylor

## 2019-12-03 NOTE — PROGRESS NOTES
[facility] Hospitalist Progress Note     PCP: Ashlee Davis MD    SUBJECTIVE:  No CP, SOB, or N/V. Pt feels his breathing has improved compared to yesterday, requiring 6L NC.     OBJECTIVE:  Temp:  [97.8 °F (36.6 °C)-98.8 °F (37.1 °C)] 98.8 °F (37.1 °C)  Pulse: Subcutaneous Daily   • azithromycin  500 mg Intravenous Once   • cefTRIAXone  1 g Intravenous Q24H   • aspirin  81 mg Oral Daily   • lisinopril  10 mg Oral Daily   • atorvastatin  20 mg Oral Nightly   • Alfuzosin HCl ER  10 mg Oral Daily with breakfast   •

## 2019-12-03 NOTE — PROGRESS NOTES
Pt back from PACU, alert and orientated. Oxygen WNL on 6L HF. No complaints of pain. Medications per MAR.  WCTM

## 2019-12-03 NOTE — PLAN OF CARE
Assumed pt care at 2300. No c/o pain. Hassan intact. O2 currently WNL on 5L. Meds given per MAR. Will continue to monitor.

## 2019-12-04 ENCOUNTER — APPOINTMENT (OUTPATIENT)
Dept: GENERAL RADIOLOGY | Facility: HOSPITAL | Age: 84
DRG: 391 | End: 2019-12-04
Attending: INTERNAL MEDICINE
Payer: MEDICARE

## 2019-12-04 PROCEDURE — 80048 BASIC METABOLIC PNL TOTAL CA: CPT | Performed by: INTERNAL MEDICINE

## 2019-12-04 PROCEDURE — 71045 X-RAY EXAM CHEST 1 VIEW: CPT | Performed by: INTERNAL MEDICINE

## 2019-12-04 PROCEDURE — 94640 AIRWAY INHALATION TREATMENT: CPT

## 2019-12-04 PROCEDURE — 94664 DEMO&/EVAL PT USE INHALER: CPT

## 2019-12-04 RX ORDER — FUROSEMIDE 10 MG/ML
40 INJECTION INTRAMUSCULAR; INTRAVENOUS ONCE
Status: COMPLETED | OUTPATIENT
Start: 2019-12-04 | End: 2019-12-04

## 2019-12-04 RX ORDER — IPRATROPIUM BROMIDE AND ALBUTEROL SULFATE 2.5; .5 MG/3ML; MG/3ML
3 SOLUTION RESPIRATORY (INHALATION)
Status: DISCONTINUED | OUTPATIENT
Start: 2019-12-04 | End: 2019-12-06

## 2019-12-04 RX ORDER — LABETALOL 200 MG/1
200 TABLET, FILM COATED ORAL EVERY 12 HOURS SCHEDULED
Status: DISCONTINUED | OUTPATIENT
Start: 2019-12-04 | End: 2019-12-05

## 2019-12-04 RX ORDER — FUROSEMIDE 10 MG/ML
80 INJECTION INTRAMUSCULAR; INTRAVENOUS ONCE
Status: DISCONTINUED | OUTPATIENT
Start: 2019-12-04 | End: 2019-12-04

## 2019-12-04 RX ORDER — LABETALOL 200 MG/1
200 TABLET, FILM COATED ORAL EVERY 12 HOURS SCHEDULED
Status: DISCONTINUED | OUTPATIENT
Start: 2019-12-04 | End: 2019-12-04

## 2019-12-04 NOTE — PLAN OF CARE
Patient arrived from [facility] around 1230. Pt a/o x 4, on vapotherm being managed by RT, O2 sats mids 90s. Denies SOB. SR on tele monitor with frequent PVCs. Hassan catheter in place draining blood tinged urine. No edema. Resting comfortably in bed.  Patient and

## 2019-12-04 NOTE — PROGRESS NOTES
[facility] Hospitalist Progress Note     PCP: June Ravi MD     Cc: follow up    SUBJECTIVE:  Pt currently on vapotherm. Transferred to CTU. No complaints of cp/sob/n/v/f/c.   Hassan in place with pink transparent urine    OBJECTIVE:  Temp:  [98.1 °F (36.7 °C)-9 • enoxaparin  30 mg Subcutaneous Daily   • cefTRIAXone  1 g Intravenous Q24H   • aspirin  81 mg Oral Daily   • lisinopril  10 mg Oral Daily   • atorvastatin  20 mg Oral Nightly   • Alfuzosin HCl ER  10 mg Oral Daily with breakfast   • bicalutamide  50 mg

## 2019-12-04 NOTE — PROGRESS NOTES
Critical Care Progress Note        NAME: Wanda De - ROOM: 584/174-Z - MRN: [id number] - Age: 80year old - : 3/23/1934  Date of Admission: 2019 10:57 AM  Admission Diagnosis: Pneumonia of both lower lobes due to infectious organism [J18.9] normal, atraumatic, no cyanosis or edema      Recent Labs     12/02/19  0611 12/02/19  1126   WBC 5.1  --    HGB 8.4*  --    MCV 91.2  --    .0*  --    INR  --  1.15*       Recent Labs     12/01/19  1245 12/02/19  0611 12/03/19  0554 12/04/19  [id number] Care Time greater than: [address] [facility] Pulmonary and Critical Care

## 2019-12-04 NOTE — PLAN OF CARE
Pt is aox, afebrile. On Vapotherm being weaned. O2 sats stable. RA BL. Tele, pt HR meds were held last [facility] and this AM due to HR fluctuating in upper 90's to low 50's. Spoke with kylah RIVERA and he stated that was ok.  pt went into afib/aflutter this AM, MD pa respiratory difficulty  - Respiratory Therapy support as indicated  - Manage/alleviate anxiety  - Monitor for signs/symptoms of CO2 retention  Outcome: Progressing     Problem: GENITOURINARY - ADULT  Goal: Absence of urinary retention  Description  Sujata Hilario

## 2019-12-04 NOTE — PROGRESS NOTES
[facility] Cardiology Progress Note        Tammie Dias Patient Status:  Inpatient    3/23/1934 MRN [id number]   [facility] 5NW-A Attending Mckay Waldrop, DO   Hosp Day # 4 PCP Toyin Sousa MD     Subjective:  OR not 12/01/19  1245 12/02/19  0611 12/03/19  0554 12/04/19  0610   * 148*  --  145  --  142   K 3.5 3.6 3.9 3.6 3.7 4.1   * 118*  --  115*  --  107   CO2 24.0 23.0  --  24.0  --  26.0   BUN 22* 25*  --  25*  --  25*   CREATSERUM 1.96* 2.22*  --  2.3

## 2019-12-04 NOTE — PHYSICAL THERAPY NOTE
PT order received, chart reviewed. Pt with increasing O2 needs overnight, transferred to CTU this PM due to changing heart rhythm. Will f/u for PT at a later date pending medical stability.

## 2019-12-04 NOTE — PLAN OF CARE
Problem: Patient/Family Goals  Goal: Patient/Family Long Term Goal  Description  Patient's Long Term Goal: Health management     Interventions:  - MAR  - Patient education   - activity as tolerated  - medications as prescribed.    - See additional Care Pl profile  - Implement strategies to promote bladder emptying  Outcome: Progressing   Pt is alert and oriented x4, forgetful at times. VSS. Unable to maintain o2 sats on r/a. Needing vapotherm at 70% overnight, r/a is baseline. Irregular HR on tele.   Pt

## 2019-12-04 NOTE — PROGRESS NOTES
[facility]  Urology Progress Note    Rickie Wicho Patient Status:  Inpatient    3/23/1934 MRN [id number]   [facility] 8NE-A Attending Mane Grimm, *   Hosp Day # 4 PCP Betty Escalona MD     Subjective:   Rickie Sands is a(

## 2019-12-05 PROCEDURE — 97530 THERAPEUTIC ACTIVITIES: CPT

## 2019-12-05 PROCEDURE — 94640 AIRWAY INHALATION TREATMENT: CPT

## 2019-12-05 PROCEDURE — 80048 BASIC METABOLIC PNL TOTAL CA: CPT | Performed by: INTERNAL MEDICINE

## 2019-12-05 PROCEDURE — 84132 ASSAY OF SERUM POTASSIUM: CPT | Performed by: HOSPITALIST

## 2019-12-05 PROCEDURE — 97116 GAIT TRAINING THERAPY: CPT

## 2019-12-05 PROCEDURE — 97162 PT EVAL MOD COMPLEX 30 MIN: CPT

## 2019-12-05 PROCEDURE — 83735 ASSAY OF MAGNESIUM: CPT | Performed by: HOSPITALIST

## 2019-12-05 PROCEDURE — 85025 COMPLETE CBC W/AUTO DIFF WBC: CPT | Performed by: HOSPITALIST

## 2019-12-05 RX ORDER — FUROSEMIDE 10 MG/ML
20 INJECTION INTRAMUSCULAR; INTRAVENOUS ONCE
Status: COMPLETED | OUTPATIENT
Start: 2019-12-05 | End: 2019-12-05

## 2019-12-05 RX ORDER — MAGNESIUM OXIDE 400 MG (241.3 MG MAGNESIUM) TABLET
800 TABLET ONCE
Status: COMPLETED | OUTPATIENT
Start: 2019-12-05 | End: 2019-12-05

## 2019-12-05 RX ORDER — LABETALOL 100 MG/1
100 TABLET, FILM COATED ORAL EVERY 12 HOURS SCHEDULED
Status: DISCONTINUED | OUTPATIENT
Start: 2019-12-05 | End: 2019-12-06

## 2019-12-05 RX ORDER — POTASSIUM CHLORIDE 20 MEQ/1
40 TABLET, EXTENDED RELEASE ORAL EVERY 4 HOURS
Status: COMPLETED | OUTPATIENT
Start: 2019-12-05 | End: 2019-12-05

## 2019-12-05 NOTE — PLAN OF CARE
Assumed pt care at 0750. Pt A&O x 4, forgetful, sinus genoveva with PAC/PVC on tele, up with contact guard assist with walker. SPO2 96% on 4 L oxygen, gradually weaned down to 2L per nasal cannula, SPO2 96%. Ambulated in the hallway, tolerated well.  Potassium needed  - Assess and instruct to report SOB or any respiratory difficulty  - Respiratory Therapy support as indicated  - Manage/alleviate anxiety  - Monitor for signs/symptoms of CO2 retention  Outcome: Progressing     Problem: GENITOURINARY - ADULT  Goal:

## 2019-12-05 NOTE — PHYSICAL THERAPY NOTE
PHYSICAL THERAPY EVALUATION - INPATIENT     Room Number: 9698/3493-T  Evaluation Date: 12/5/2019  Type of Evaluation: Initial  Physician Order: PT Eval and Treat    Presenting Problem: acute hypoxic respiratory failure  Reason for Therapy: Mobility D BIOPSY Right 9/19/2017    Performed by Doretha Workman MD at [address]  Type of Home: [facility]   Home Layout: Two level  Stairs to Enter : 2  Railing: Yes  Stairs to Bedroom: 14       Lives With: Spouse  Drives: Yes  P to a chair (including a wheelchair)?: A Little   -   Need to walk in hospital room?: A Little   -   Climbing 3-5 steps with a railing?: A Little       AM-PAC Score:  Raw Score: 21   Approx Degree of Impairment: 28.97%   Standardized Score (AM-PAC Scale): 5 Based on this evaluation, patient's clinical presentation is stable and overall the evaluation complexity is considered moderate.   These impairments and comorbidities manifest themselves as functional limitations in independent bed mobility, transfers, and

## 2019-12-05 NOTE — PROGRESS NOTES
[facility] & [address] Patient Status:  Inpatient    3/23/1934 MRN [id number]   [facility] 8NE-A Attending Avel Chance, *   Hosp Day # 5 PCP Toyin Sousa MD     Pulm / Critical Care Progress Note     S: pt states that 5.6 5.1 7.0   HGB 9.2* 8.4* 10.1*   HCT 28.6* 25.9* 30.9*   .0* 117.0* 148.0*     Recent Labs   Lab 12/02/19  1126   INR 1.15*         Recent Labs   Lab 12/02/19  0611 12/03/19  0554 12/04/19  0610 12/05/19  0510     --  142 141   K 3.6 3.7 4.

## 2019-12-05 NOTE — HOME CARE LIAISON
Patient declined Residential home health at this time. Brochure and contact information provided and answered all questions.   Thank you for the referral.

## 2019-12-05 NOTE — PROGRESS NOTES
[facility] Group Cardiology Progress Note        Edil Lizarraga Patient Status:  Inpatient    3/23/1934 MRN [id number]   [facility] 5NW-A Attending Kat Rashid, DO   Hosp Day # 5 PCP Elsi Roach MD     Subjective:  O2 nee HGB 9.6* 9.2* 8.4* 10.1*   .0 128.0* 117.0* 148.0*       Chem:  Recent Labs   Lab 11/30/19  1121 12/01/19  0642 12/01/19  1245 12/02/19  0611 12/03/19  0554 12/04/19  0610 12/05/19  0510   * 148*  --  145  --  142 141   K 3.5 3.6 3.9 3.6 3.7

## 2019-12-05 NOTE — PROGRESS NOTES
[facility] Hospitalist Progress Note     PCP: Mario Aquino MD     Cc: follow up    SUBJECTIVE:  Pt now down to 2L NC. No cp/sob/n/v/f/c. Walked halls today.   Wife at bedside    OBJECTIVE:  Temp:  [98.1 °F (36.7 °C)-98.7 °F (37.1 °C)] 98.2 °F (36.8 °C)  Pulse:  [ Meds:     • Labetalol HCl  100 mg Oral 2 times per day   • ipratropium-albuterol  3 mL Nebulization 4 times per day   • enoxaparin  30 mg Subcutaneous Daily   • cefTRIAXone  1 g Intravenous Q24H   • aspirin  81 mg Oral Daily   • lisinopril  10 mg O discussed with patient and/or family by bedside.     Thank Jt Oh MD    [facility] Hospitalist  Answering Service number: [phone]

## 2019-12-05 NOTE — PLAN OF CARE
Assumed care of patient at 1900. Patient is A&Ox4, NSR on tele with frequent PAC/PVC. Denies pain, SOB, and palpitations. Lung sounds are diminished upon auscultation. Hassan draining yellow, pink tinged urine. 6L Nasal cannula, will wean as tolerated. .

## 2019-12-05 NOTE — PLAN OF CARE
Pt is A&Ox4. 2L NC, lung sounds diminished. NSR with PVC/PACs on tele, denies cardiovascular symptoms. Hassan draining yellow urine. Hassan care PRN. Continent of bowel. Up with SBA. Fall precautions in place.      POC updated with patient, he verbalized unde GENITOURINARY - ADULT  Goal: Absence of urinary retention  Description  INTERVENTIONS:  - Assess patient’s ability to void and empty bladder  - Monitor intake/output and perform bladder scan as needed  - Follow urinary retention protocol/standard of care

## 2019-12-06 VITALS
SYSTOLIC BLOOD PRESSURE: 136 MMHG | OXYGEN SATURATION: 95 % | WEIGHT: 119.5 LBS | RESPIRATION RATE: 17 BRPM | DIASTOLIC BLOOD PRESSURE: 64 MMHG | BODY MASS INDEX: 19 KG/M2 | HEART RATE: 76 BPM | TEMPERATURE: 98 F

## 2019-12-06 PROCEDURE — 83735 ASSAY OF MAGNESIUM: CPT | Performed by: HOSPITALIST

## 2019-12-06 PROCEDURE — 94640 AIRWAY INHALATION TREATMENT: CPT

## 2019-12-06 PROCEDURE — 80048 BASIC METABOLIC PNL TOTAL CA: CPT | Performed by: INTERNAL MEDICINE

## 2019-12-06 PROCEDURE — 85025 COMPLETE CBC W/AUTO DIFF WBC: CPT | Performed by: INTERNAL MEDICINE

## 2019-12-06 PROCEDURE — 97165 OT EVAL LOW COMPLEX 30 MIN: CPT

## 2019-12-06 PROCEDURE — 97535 SELF CARE MNGMENT TRAINING: CPT

## 2019-12-06 RX ORDER — MAGNESIUM OXIDE 400 MG (241.3 MG MAGNESIUM) TABLET
400 TABLET ONCE
Status: COMPLETED | OUTPATIENT
Start: 2019-12-06 | End: 2019-12-06

## 2019-12-06 RX ORDER — LABETALOL 100 MG/1
100 TABLET, FILM COATED ORAL 2 TIMES DAILY
Qty: 60 TABLET | Refills: 3 | Status: SHIPPED | OUTPATIENT
Start: 2019-12-06 | End: 2020-03-09

## 2019-12-06 NOTE — PLAN OF CARE
Received patient at 0730. Alert and Oriented x3, but very forgetful. Tele Rhythm NSR with PVC/PAC. O2 saturation 95% on room air. O2 walk completed, O2 not needed. Breath sounds clear. Bed is locked and in low position.  Call light and personal items within ABGs  - Provide Smoking Cessation handout, if applicable  - Encourage broncho-pulmonary hygiene including cough, deep breathe, Incentive Spirometry  - Assess the need for suctioning and perform as needed  - Assess and instruct to report SOB or any respirat

## 2019-12-06 NOTE — PROGRESS NOTES
[facility] Hospitalist Progress Note     PCP: Jaycee Mckay MD     Cc: follow up    SUBJECTIVE:  Pt now down to 1L NC. Eating breakfast. No cp/sob/n/v/f/c.     Wife at bedside    OBJECTIVE:  Temp:  [97.6 °F (36.4 °C)-98.6 °F (37 °C)] 98.1 °F (36.7 °C)  Pulse:  [58 --  100*    < > = values in this interval not displayed.        Recent Labs   Lab 11/30/19  1121   ALT 19   AST 21   ALB 4.0         Meds:     • Labetalol HCl  100 mg Oral 2 times per day   • ipratropium-albuterol  3 mL Nebulization 4 times per day   • evan anemia-stable, monitor     PPX: lovenox    RN at bedside    Dispo: Ok to d/c from IM standpoint       Questions/concerns were discussed with patient and/or family by bedside.     Thank Dima Swan MD    [facility] Hospitalist  Answering Service n

## 2019-12-06 NOTE — PROGRESS NOTES
Patient seen in follow-up. Patient voided. PVR - ~180 mL. Patient denies SP/flank pain.       /51 (BP Location: Left arm)   Pulse 77   Temp 97.9 °F (36.6 °C) (Oral)   Resp 18   Wt 119 lb 7.8 oz (54.2 kg)   SpO2 92%   BMI 19.29 kg/m²   General

## 2019-12-06 NOTE — PROGRESS NOTES
Contacted hospitalist - patient will be DC today.     ASSESSMENT/PLAN:  URETHRAL STRICTURE, BILATERAL HYDRONEPHROSIS RELATED TO URINARY RETENTION  POD #4 following cystoscopy, bilateral retrograde pyelogram, marie catheter insertion  Findings: Yash Mcgovern

## 2019-12-06 NOTE — PROGRESS NOTES
[facility] Cardiology Progress Note        Falguni Rush Patient Status:  Inpatient    3/23/1934 MRN [id number]   [facility] 5NW-A Attending Cori Arreguin, DO   Hosp Day # 6 PCP Sheba Whelan MD     Subjective:  Breath HGB 9.6* 9.2* 8.4* 10.1* 10.0*   .0 128.0* 117.0* 148.0* 165.0       Chem:  Recent Labs   Lab 12/01/19  0642  12/02/19  0611 12/03/19  0554 12/04/19  0610 12/05/19  0510 12/05/19  1820 12/06/19  0458   *  --  145  --  142 141  --  141   K 3.

## 2019-12-06 NOTE — PLAN OF CARE
Pt is A&OX4, forgetful. 1L NC, needs O2 walk in AM. NSR/SB with PVC/PAC on tele, denies cardiovascular symptoms. VSS. Continent of bowel. Hassan draining pink/yellow urine. Up with SBA walker. PIV flushes well L AC. IV ABX for pna Q24H.      POC Updated with Progressing     Problem: GENITOURINARY - ADULT  Goal: Absence of urinary retention  Description  INTERVENTIONS:  - Assess patient’s ability to void and empty bladder  - Monitor intake/output and perform bladder scan as needed  - Follow urinary retention pr

## 2019-12-06 NOTE — PLAN OF CARE
NURSING DISCHARGE NOTE    Discharged Home via Wheelchair. Accompanied by Spouse  Belongings Taken by patient/family     Pt and family verbalizes understanding of d/c teaching.  Pt instructed to  meds from pharmacy and when to follow up with PCP

## 2019-12-06 NOTE — OCCUPATIONAL THERAPY NOTE
OCCUPATIONAL THERAPY EVALUATION - INPATIENT     Room Number: 2619/8819-U  Evaluation Date: 12/6/2019  Type of Evaluation: Initial  Presenting Problem: respiratory distress, urethral stricture    Physician Order: IP Consult to Occupational Therapy  Reason f LYMPH NODE BIOPSY Right 9/19/2017    Performed by Heather Zambrano MD at [place] [address] SITUATION  Type of Home: Townhouse  Home Layout: Two level  Lives With: Spouse    Toilet and Equipment: Standard height blayne such as brushing teeth?: None  -   Eating meals?: None    AM-PAC Score:  Score: 21  Approx Degree of Impairment: 32.79%  Standardized Score (AM-PAC Scale): 44.27  CMS Modifier (G-Code): CJ    FUNCTIONAL TRANSFER ASSESSMENT  Supine to Sit : Not tested  Sit tasks    Clinical Decision Making LOW - Analysis of occupational profile, problem-focused assessments, limited treatment options    Overall Complexity LOW     OT Discharge Recommendations: Home with home health PT/OT       PLAN  OT Treatment Plan: Balance

## 2019-12-06 NOTE — PROGRESS NOTES
[facility] & [address] Patient Status:  Inpatient    3/23/1934 MRN [id number]   [facility] 8NE-A Attending Valentino Papas, *   Hosp Day # 6 PCP Patrizia Lange MD     SUBJECTIVE: Pt feels well, is hopeful he'll be able to co wall: No tenderness or deformity.                         AILKK: PIDPNAE rate and rhythm, normal S1S2                          Abdomen: soft, non-tender, non-distended, positive BS.                         Extremity: No clubbing or cyanosis.  no edema
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
## 2019-12-07 NOTE — DISCHARGE SUMMARY
General Medicine Discharge Summary     Patient ID:  Leena Martin  80year old  [id number]  3/23/1934    Admit date: 11/30/2019    Discharge date and time: 12/6/2019  5:53 PM     Attending Physician: Katty Gonzales MD    Primary Care Physician: Ashkan Cool I/Os, daily weights     #CATHY on CKD-improving, seems to be at a plateau now  -likely 2/2 obstruction  -urology consulted - s/p dilation of ureteral stricture with marie placement.   Voiding trial prior to dc.       Hematuria-improving  -due to cysto with [facility] transmitted to the  [facility] of [facility]) Sloane Garcia 35 ([address]) which includes the Dose  Index Registry. PATIENT STATED HISTORY: (As transcribed by Technologist)  Patient presents with hypoxia and bilateral pneumonia.     FIN Approved by: Asha Marrero MD on 12/01/2019 at 20:37          Mri Brain (cpt=[id number])    Result Date: 11/30/2019  DATE OF SERVICE: 11.29.2019 MRI BRAIN WITHOUT CONTRAST INDICATION: Dementia, bilateral weakness for 3 months, difficulty walking COMPARISON: No elevated d-dimer  TECHNIQUE:  Real time, grey scale, and duplex ultrasound was used to evaluate the lower extremity venous system. B-mode two-dimensional images of the vascular structures, Doppler spectral analysis, and color flow.   Doppler imaging were pe CHEST PA + LAT CHEST (CPT=71046), 11/30/2019, 11:46. INDICATIONS:  hypoxia  PATIENT STATED HISTORY: (As transcribed by Technologist)  Patient offered no additional history at this time.     FINDINGS:  There is increasing bilateral pleural effusions, appear 12/2/2019  PROCEDURE:  XR OR - N/C  COMPARISON:  None. INDICATIONS:  Bilateral hydronephrosis. Urethral stricture.   TECHNIQUE:   FLUOROSCOPY IMAGES OBTAINED:  3 FLUOROSCOPY TIME:  49 seconds TECHNOLOGIST TIME:  30 minutes RADIATION DOSE (AIR KERMA [address]        Schedule an appointment with Barbara Bonner MD as soon as possible for a visit in 1 week(s)  [facility]   [address] [facility] [phone]           Follow up with Ni Ace MD in 2 week(s)   pulmonar minutes    Patient had opportunity to ask questions and state understand and agree with therapeutic plan as outlined    Thank You,  Key Oglesby MD    [facility] Hospitalist  Pager [phone]

## 2019-12-10 PROBLEM — E83.52 HYPERCALCEMIA: Status: RESOLVED | Noted: 2019-05-21 | Resolved: 2019-12-10

## 2019-12-10 PROBLEM — I67.3: Status: ACTIVE | Noted: 2019-12-10

## 2019-12-10 PROBLEM — R79.89 ELEVATED SERUM CREATININE: Status: RESOLVED | Noted: 2019-05-21 | Resolved: 2019-12-10

## 2019-12-10 PROBLEM — N17.9 AKI (ACUTE KIDNEY INJURY) (HCC): Status: ACTIVE | Noted: 2019-12-10

## 2019-12-10 PROBLEM — J96.00 ACUTE RESPIRATORY FAILURE, UNSPECIFIED WHETHER WITH HYPOXIA OR HYPERCAPNIA (HCC): Status: ACTIVE | Noted: 2019-12-10

## 2019-12-10 PROBLEM — J18.9 PNEUMONIA OF BOTH LOWER LOBES DUE TO INFECTIOUS ORGANISM: Status: RESOLVED | Noted: 2019-11-30 | Resolved: 2019-12-10

## 2020-01-06 PROBLEM — Z85.820 HISTORY OF MALIGNANT MELANOMA: Status: ACTIVE | Noted: 2020-01-06

## 2020-02-10 PROBLEM — J96.00 ACUTE RESPIRATORY FAILURE, UNSPECIFIED WHETHER WITH HYPOXIA OR HYPERCAPNIA (HCC): Status: RESOLVED | Noted: 2019-12-10 | Resolved: 2020-02-10

## 2020-02-10 PROBLEM — N17.9 AKI (ACUTE KIDNEY INJURY) (HCC): Status: RESOLVED | Noted: 2019-12-10 | Resolved: 2020-02-10

## 2020-03-03 PROBLEM — J43.9 EMPHYSEMA LUNG (HCC): Status: ACTIVE | Noted: 2020-03-03

## 2020-03-03 PROBLEM — I70.0 AORTIC ATHEROSCLEROSIS (HCC): Status: ACTIVE | Noted: 2020-03-03

## 2020-03-03 PROBLEM — J44.9 COPD (CHRONIC OBSTRUCTIVE PULMONARY DISEASE) (HCC): Status: ACTIVE | Noted: 2020-03-03

## 2020-03-03 PROBLEM — D69.2 PURPURA SENILIS (HCC): Status: ACTIVE | Noted: 2020-03-03

## 2020-03-03 PROBLEM — I42.8 OTHER CARDIOMYOPATHY (HCC): Status: ACTIVE | Noted: 2020-03-03

## 2020-03-09 PROBLEM — I48.0 PAROXYSMAL ATRIAL FIBRILLATION (HCC): Status: ACTIVE | Noted: 2020-03-09

## 2020-03-09 PROBLEM — E55.9 VITAMIN D DEFICIENCY: Status: ACTIVE | Noted: 2020-03-09

## 2020-03-09 PROBLEM — H61.21 IMPACTED CERUMEN OF RIGHT EAR: Status: ACTIVE | Noted: 2020-03-09

## 2020-03-09 PROBLEM — D69.2 SENILE PURPURA (HCC): Status: ACTIVE | Noted: 2020-03-03

## 2020-07-07 PROBLEM — Z85.820 HISTORY OF MELANOMA: Status: ACTIVE | Noted: 2020-01-06

## 2020-10-13 PROBLEM — D68.69 HYPERCOAGULABLE STATE, SECONDARY (HCC): Status: ACTIVE | Noted: 2020-10-13

## 2020-10-20 PROBLEM — G91.2 NORMAL PRESSURE HYDROCEPHALUS (HCC): Status: ACTIVE | Noted: 2020-10-20

## 2020-10-20 PROBLEM — N35.919 STRICTURE OF MALE URETHRA, UNSPECIFIED STRICTURE TYPE: Status: ACTIVE | Noted: 2017-01-26

## 2020-10-20 PROBLEM — I27.20 PULMONARY HYPERTENSION (HCC): Status: ACTIVE | Noted: 2020-10-20

## 2020-10-20 PROBLEM — I77.819 AORTIC ECTASIA (HCC): Status: ACTIVE | Noted: 2020-10-20

## 2020-10-20 PROBLEM — R33.9 URINE RETENTION: Status: ACTIVE | Noted: 2020-10-20

## 2020-10-20 PROBLEM — G31.9 BRAIN ATROPHY (HCC): Status: ACTIVE | Noted: 2020-10-20

## 2020-11-03 ENCOUNTER — APPOINTMENT (OUTPATIENT)
Dept: LAB | Facility: HOSPITAL | Age: 85
End: 2020-11-03
Attending: UROLOGY
Payer: MEDICARE

## 2020-11-03 ENCOUNTER — APPOINTMENT (OUTPATIENT)
Dept: LAB | Facility: HOSPITAL | Age: 85
End: 2020-11-03
Payer: MEDICARE

## 2020-11-03 DIAGNOSIS — C61 PROSTATE CANCER (HCC): ICD-10-CM

## 2020-11-03 PROCEDURE — 93005 ELECTROCARDIOGRAM TRACING: CPT

## 2020-11-03 PROCEDURE — 93010 ELECTROCARDIOGRAM REPORT: CPT | Performed by: INTERNAL MEDICINE

## 2020-11-04 ENCOUNTER — ANESTHESIA EVENT (OUTPATIENT)
Dept: SURGERY | Facility: HOSPITAL | Age: 85
End: 2020-11-04
Payer: MEDICARE

## 2020-11-06 ENCOUNTER — HOSPITAL ENCOUNTER (OUTPATIENT)
Facility: HOSPITAL | Age: 85
Setting detail: HOSPITAL OUTPATIENT SURGERY
Discharge: HOME | End: 2020-11-06
Attending: UROLOGY | Admitting: UROLOGY
Payer: MEDICARE

## 2020-11-06 ENCOUNTER — APPOINTMENT (OUTPATIENT)
Dept: GENERAL RADIOLOGY | Facility: HOSPITAL | Age: 85
End: 2020-11-06
Attending: UROLOGY
Payer: MEDICARE

## 2020-11-06 ENCOUNTER — ANESTHESIA (OUTPATIENT)
Dept: SURGERY | Facility: HOSPITAL | Age: 85
End: 2020-11-06
Payer: MEDICARE

## 2020-11-06 VITALS
TEMPERATURE: 98 F | OXYGEN SATURATION: 90 % | RESPIRATION RATE: 16 BRPM | SYSTOLIC BLOOD PRESSURE: 130 MMHG | BODY MASS INDEX: 23.18 KG/M2 | HEART RATE: 58 BPM | DIASTOLIC BLOOD PRESSURE: 68 MMHG | HEIGHT: 64 IN | WEIGHT: 135.81 LBS

## 2020-11-06 DIAGNOSIS — C61 PROSTATE CANCER (HCC): Primary | ICD-10-CM

## 2020-11-06 PROCEDURE — 0T7D8ZZ DILATION OF URETHRA, VIA NATURAL OR ARTIFICIAL OPENING ENDOSCOPIC: ICD-10-PCS | Performed by: UROLOGY

## 2020-11-06 RX ORDER — NALOXONE HYDROCHLORIDE 0.4 MG/ML
80 INJECTION, SOLUTION INTRAMUSCULAR; INTRAVENOUS; SUBCUTANEOUS AS NEEDED
Status: DISCONTINUED | OUTPATIENT
Start: 2020-11-06 | End: 2020-11-06

## 2020-11-06 RX ORDER — PHENYLEPHRINE HCL 10 MG/ML
VIAL (ML) INJECTION AS NEEDED
Status: DISCONTINUED | OUTPATIENT
Start: 2020-11-06 | End: 2020-11-06 | Stop reason: SURG

## 2020-11-06 RX ORDER — EPHEDRINE SULFATE 50 MG/ML
INJECTION, SOLUTION INTRAVENOUS AS NEEDED
Status: DISCONTINUED | OUTPATIENT
Start: 2020-11-06 | End: 2020-11-06 | Stop reason: SURG

## 2020-11-06 RX ORDER — HYDROCODONE BITARTRATE AND ACETAMINOPHEN 5; 325 MG/1; MG/1
1 TABLET ORAL EVERY 4 HOURS PRN
Qty: 6 TABLET | Refills: 0 | Status: SHIPPED | OUTPATIENT
Start: 2020-11-06 | End: 2020-11-16

## 2020-11-06 RX ORDER — SODIUM CHLORIDE, SODIUM LACTATE, POTASSIUM CHLORIDE, CALCIUM CHLORIDE 600; 310; 30; 20 MG/100ML; MG/100ML; MG/100ML; MG/100ML
INJECTION, SOLUTION INTRAVENOUS CONTINUOUS
Status: DISCONTINUED | OUTPATIENT
Start: 2020-11-06 | End: 2020-11-06

## 2020-11-06 RX ORDER — HYDROCODONE BITARTRATE AND ACETAMINOPHEN 5; 325 MG/1; MG/1
2 TABLET ORAL AS NEEDED
Status: DISCONTINUED | OUTPATIENT
Start: 2020-11-06 | End: 2020-11-06

## 2020-11-06 RX ORDER — ALBUTEROL SULFATE 2.5 MG/3ML
2.5 SOLUTION RESPIRATORY (INHALATION) AS NEEDED
Status: DISCONTINUED | OUTPATIENT
Start: 2020-11-06 | End: 2020-11-06

## 2020-11-06 RX ORDER — KETOROLAC TROMETHAMINE 30 MG/ML
INJECTION, SOLUTION INTRAMUSCULAR; INTRAVENOUS AS NEEDED
Status: DISCONTINUED | OUTPATIENT
Start: 2020-11-06 | End: 2020-11-06 | Stop reason: SURG

## 2020-11-06 RX ORDER — ACETAMINOPHEN 500 MG
500 TABLET ORAL EVERY 6 HOURS PRN
COMMUNITY

## 2020-11-06 RX ORDER — LIDOCAINE HYDROCHLORIDE 10 MG/ML
INJECTION, SOLUTION EPIDURAL; INFILTRATION; INTRACAUDAL; PERINEURAL AS NEEDED
Status: DISCONTINUED | OUTPATIENT
Start: 2020-11-06 | End: 2020-11-06 | Stop reason: SURG

## 2020-11-06 RX ORDER — CLINDAMYCIN PHOSPHATE 900 MG/50ML
900 INJECTION INTRAVENOUS ONCE
Status: COMPLETED | OUTPATIENT
Start: 2020-11-06 | End: 2020-11-06

## 2020-11-06 RX ORDER — ONDANSETRON 2 MG/ML
INJECTION INTRAMUSCULAR; INTRAVENOUS AS NEEDED
Status: DISCONTINUED | OUTPATIENT
Start: 2020-11-06 | End: 2020-11-06 | Stop reason: SURG

## 2020-11-06 RX ORDER — ACETAMINOPHEN 500 MG
1000 TABLET ORAL ONCE
Status: DISCONTINUED | OUTPATIENT
Start: 2020-11-06 | End: 2020-11-06

## 2020-11-06 RX ORDER — DEXAMETHASONE SODIUM PHOSPHATE 4 MG/ML
VIAL (ML) INJECTION AS NEEDED
Status: DISCONTINUED | OUTPATIENT
Start: 2020-11-06 | End: 2020-11-06 | Stop reason: SURG

## 2020-11-06 RX ORDER — MEPERIDINE HYDROCHLORIDE 25 MG/ML
12.5 INJECTION INTRAMUSCULAR; INTRAVENOUS; SUBCUTANEOUS AS NEEDED
Status: DISCONTINUED | OUTPATIENT
Start: 2020-11-06 | End: 2020-11-06

## 2020-11-06 RX ORDER — HYDROCODONE BITARTRATE AND ACETAMINOPHEN 5; 325 MG/1; MG/1
1 TABLET ORAL AS NEEDED
Status: DISCONTINUED | OUTPATIENT
Start: 2020-11-06 | End: 2020-11-06

## 2020-11-06 RX ORDER — HYDROMORPHONE HYDROCHLORIDE 1 MG/ML
0.4 INJECTION, SOLUTION INTRAMUSCULAR; INTRAVENOUS; SUBCUTANEOUS EVERY 5 MIN PRN
Status: DISCONTINUED | OUTPATIENT
Start: 2020-11-06 | End: 2020-11-06

## 2020-11-06 RX ADMIN — PHENYLEPHRINE HCL 50 MCG: 10 MG/ML VIAL (ML) INJECTION at 07:50:00

## 2020-11-06 RX ADMIN — CLINDAMYCIN PHOSPHATE 900 MG: 900 INJECTION INTRAVENOUS at 07:45:00

## 2020-11-06 RX ADMIN — DEXAMETHASONE SODIUM PHOSPHATE 4 MG: 4 MG/ML VIAL (ML) INJECTION at 07:40:00

## 2020-11-06 RX ADMIN — PHENYLEPHRINE HCL 50 MCG: 10 MG/ML VIAL (ML) INJECTION at 07:45:00

## 2020-11-06 RX ADMIN — EPHEDRINE SULFATE 5 MG: 50 INJECTION, SOLUTION INTRAVENOUS at 07:50:00

## 2020-11-06 RX ADMIN — LIDOCAINE HYDROCHLORIDE 100 MG: 10 INJECTION, SOLUTION EPIDURAL; INFILTRATION; INTRACAUDAL; PERINEURAL at 07:40:00

## 2020-11-06 RX ADMIN — EPHEDRINE SULFATE 5 MG: 50 INJECTION, SOLUTION INTRAVENOUS at 07:55:00

## 2020-11-06 RX ADMIN — ONDANSETRON 4 MG: 2 INJECTION INTRAMUSCULAR; INTRAVENOUS at 07:40:00

## 2020-11-06 RX ADMIN — KETOROLAC TROMETHAMINE 30 MG: 30 INJECTION, SOLUTION INTRAMUSCULAR; INTRAVENOUS at 08:00:00

## 2020-11-06 RX ADMIN — PHENYLEPHRINE HCL 50 MCG: 10 MG/ML VIAL (ML) INJECTION at 07:40:00

## 2020-11-06 RX ADMIN — SODIUM CHLORIDE, SODIUM LACTATE, POTASSIUM CHLORIDE, CALCIUM CHLORIDE: 600; 310; 30; 20 INJECTION, SOLUTION INTRAVENOUS at 08:19:00

## 2020-11-06 NOTE — ANESTHESIA POSTPROCEDURE EVALUATION
[facility] & [address] Patient Status:  Hospital Outpatient Surgery   Age/Gender 80year old male MRN [id number]   [facility] SURGERY Attending Ya Arriaga MD   Hosp Day # 0 PCP Leif Lazo MD       Anesthesia Post-op Note    P

## 2020-11-06 NOTE — ANESTHESIA PREPROCEDURE EVALUATION
PRE-OP EVALUATION    Patient Name: Rickie Sands    Pre-op Diagnosis: Prostate cancer ([address] Bere Chandler  History of urethral stricture    Procedure(s):  CYSTOSCOPY, URETHRAL  DILATION, POSSIBLE  RETROGRADE PYELOGRAM    Surgeon(s) and Role:     Antoine Strong Edwin   • RIGHT LASER-ASSISTED CATARACT SURGERY WITH PHACOEMULSIFICATION WITH POSTERIOR CHAMBER LENS IMPLANTATION Right 2/18/2015    Performed by Tamie Gonzales MD at [address] [place] [address]   • SENTINEL LYMPH NODE BIOPSY Right 9/19/2017    Perf

## 2020-11-06 NOTE — H&P
Lynn Driver MD  Internal Medicine  Stricture of male urethra, unspecified stricture type +4 more  Dx  Pre-Op Exam  Reason for Visit          Reason for Visit    Pre-Op Exam    Progress Notes          Reason for Visit   Pre-Op Exam        History of Sheldon x9wks for CaP   • OTHER SURGICAL HISTORY   1/10/17     Cysto-   • RIGHT LASER-ASSISTED CATARACT SURGERY WITH PHACOEMULSIFICATION WITH POSTERIOR CHAMBER LENS IMPLANTATION Right 2/18/2015     Performed by Suzie Gregory MD at [facility]        Review of Systems   No fever or rash.  Some nasal congestion in the mornings.       Physical Examination   /76   Pulse 67   Temp 96.8 °F (36 °C)   Resp 14   Ht 5' 4\" (1.626 m)   Wt 137 lb (62.1 kg)   BMI 23.52 kg/m²   GENERAL: well develop condition since the H&P was performed. Risks and benefits were discussed, proceed with procedure as planned.

## 2020-11-06 NOTE — ANESTHESIA PROCEDURE NOTES
Airway  Date/Time: 11/6/2020 7:42 AM  Urgency: elective    Airway not difficult    General Information and Staff    Patient location during procedure: OR  Anesthesiologist: Eugene Colunga MD  Performed: anesthesiologist     Indications and Patient Condition

## 2020-11-06 NOTE — BRIEF OP NOTE
Pre-Operative Diagnosis: Prostate cancer,  urethral stricture     Post-Operative Diagnosis: prostate cancer, urethral stricture     Procedure Performed:   Procedure(s):  CYSTOSCOPY, URETHRAL  DILATION    Surgeon(s) and Role:     Akash Gonsalez MD - [facility]

## 2020-11-06 NOTE — OPERATIVE REPORT
[facility]    PATIENT'S NAME: Herminia Otf   ATTENDING PHYSICIAN: Cindy Newell M.D. OPERATING PHYSICIAN: Cindy Newell M.D.    PATIENT ACCOUNT#:   [de-identified]    LOCATION:  [address] 10  MEDICAL RECORD #:   [id number]       DATE OF Monday    Dictated By Jensen Pablo M.D.  d: 11/06/2020 08:18:48  t: 11/06/2020 16:48:10  [facility] [id number]  EL/

## 2020-11-06 NOTE — ANESTHESIA POSTPROCEDURE EVALUATION
[facility] & [address] Patient Status:  Hospital Outpatient Surgery   Age/Gender 80year old male MRN [id number]   [facility] SURGERY Attending Farhana Garcia MD   Hosp Day # 0 PCP Elif Yang MD       Anesthesia Post-op Note    P

## 2020-11-06 NOTE — ANESTHESIA POSTPROCEDURE EVALUATION
[facility] & [address] Patient Status:  Hospital Outpatient Surgery   Age/Gender 80year old male MRN [id number]   [facility] SURGERY Attending Jagruti Hightower MD   Hosp Day # 0 PCP Juancho Mathur MD       Anesthesia Post-op Note    P

## 2020-11-24 ENCOUNTER — APPOINTMENT (OUTPATIENT)
Dept: GENERAL RADIOLOGY | Facility: HOSPITAL | Age: 85
DRG: 871 | End: 2020-11-24
Attending: EMERGENCY MEDICINE
Payer: MEDICARE

## 2020-11-24 ENCOUNTER — HOSPITAL ENCOUNTER (INPATIENT)
Facility: HOSPITAL | Age: 85
LOS: 8 days | Discharge: NURSING, REHAB OR LONG-TERM CARE | DRG: 871 | End: 2020-12-02
Attending: EMERGENCY MEDICINE | Admitting: HOSPITALIST
Payer: MEDICARE

## 2020-11-24 ENCOUNTER — APPOINTMENT (OUTPATIENT)
Dept: CT IMAGING | Facility: HOSPITAL | Age: 85
DRG: 871 | End: 2020-11-24
Attending: EMERGENCY MEDICINE
Payer: MEDICARE

## 2020-11-24 DIAGNOSIS — R65.21 SEPTIC SHOCK (HCC): Primary | ICD-10-CM

## 2020-11-24 DIAGNOSIS — E87.2 METABOLIC ACIDOSIS: ICD-10-CM

## 2020-11-24 DIAGNOSIS — N17.9 ACUTE RENAL FAILURE, UNSPECIFIED ACUTE RENAL FAILURE TYPE (HCC): ICD-10-CM

## 2020-11-24 DIAGNOSIS — A41.9 SEPTIC SHOCK (HCC): Primary | ICD-10-CM

## 2020-11-24 DIAGNOSIS — E87.5 HYPERKALEMIA: ICD-10-CM

## 2020-11-24 PROBLEM — E87.20 METABOLIC ACIDOSIS: Status: ACTIVE | Noted: 2020-11-24

## 2020-11-24 PROCEDURE — 82550 ASSAY OF CK (CPK): CPT | Performed by: EMERGENCY MEDICINE

## 2020-11-24 PROCEDURE — 82962 GLUCOSE BLOOD TEST: CPT

## 2020-11-24 PROCEDURE — 83690 ASSAY OF LIPASE: CPT | Performed by: EMERGENCY MEDICINE

## 2020-11-24 PROCEDURE — 87086 URINE CULTURE/COLONY COUNT: CPT | Performed by: EMERGENCY MEDICINE

## 2020-11-24 PROCEDURE — 83605 ASSAY OF LACTIC ACID: CPT | Performed by: EMERGENCY MEDICINE

## 2020-11-24 PROCEDURE — 84484 ASSAY OF TROPONIN QUANT: CPT | Performed by: EMERGENCY MEDICINE

## 2020-11-24 PROCEDURE — 85007 BL SMEAR W/DIFF WBC COUNT: CPT | Performed by: EMERGENCY MEDICINE

## 2020-11-24 PROCEDURE — 83880 ASSAY OF NATRIURETIC PEPTIDE: CPT | Performed by: EMERGENCY MEDICINE

## 2020-11-24 PROCEDURE — 85730 THROMBOPLASTIN TIME PARTIAL: CPT | Performed by: EMERGENCY MEDICINE

## 2020-11-24 PROCEDURE — 87186 SC STD MICRODIL/AGAR DIL: CPT | Performed by: EMERGENCY MEDICINE

## 2020-11-24 PROCEDURE — 96367 TX/PROPH/DG ADDL SEQ IV INF: CPT

## 2020-11-24 PROCEDURE — 84132 ASSAY OF SERUM POTASSIUM: CPT | Performed by: EMERGENCY MEDICINE

## 2020-11-24 PROCEDURE — 81001 URINALYSIS AUTO W/SCOPE: CPT | Performed by: EMERGENCY MEDICINE

## 2020-11-24 PROCEDURE — 96365 THER/PROPH/DIAG IV INF INIT: CPT

## 2020-11-24 PROCEDURE — 85018 HEMOGLOBIN: CPT | Performed by: EMERGENCY MEDICINE

## 2020-11-24 PROCEDURE — 96361 HYDRATE IV INFUSION ADD-ON: CPT

## 2020-11-24 PROCEDURE — 82803 BLOOD GASES ANY COMBINATION: CPT | Performed by: EMERGENCY MEDICINE

## 2020-11-24 PROCEDURE — 87040 BLOOD CULTURE FOR BACTERIA: CPT | Performed by: EMERGENCY MEDICINE

## 2020-11-24 PROCEDURE — 85610 PROTHROMBIN TIME: CPT | Performed by: EMERGENCY MEDICINE

## 2020-11-24 PROCEDURE — 85027 COMPLETE CBC AUTOMATED: CPT | Performed by: EMERGENCY MEDICINE

## 2020-11-24 PROCEDURE — 99291 CRITICAL CARE FIRST HOUR: CPT

## 2020-11-24 PROCEDURE — 74176 CT ABD & PELVIS W/O CONTRAST: CPT | Performed by: EMERGENCY MEDICINE

## 2020-11-24 PROCEDURE — 36415 COLL VENOUS BLD VENIPUNCTURE: CPT

## 2020-11-24 PROCEDURE — 70450 CT HEAD/BRAIN W/O DYE: CPT | Performed by: EMERGENCY MEDICINE

## 2020-11-24 PROCEDURE — 93010 ELECTROCARDIOGRAM REPORT: CPT

## 2020-11-24 PROCEDURE — 71045 X-RAY EXAM CHEST 1 VIEW: CPT | Performed by: EMERGENCY MEDICINE

## 2020-11-24 PROCEDURE — 36600 WITHDRAWAL OF ARTERIAL BLOOD: CPT | Performed by: EMERGENCY MEDICINE

## 2020-11-24 PROCEDURE — 85025 COMPLETE CBC W/AUTO DIFF WBC: CPT | Performed by: EMERGENCY MEDICINE

## 2020-11-24 PROCEDURE — 80053 COMPREHEN METABOLIC PANEL: CPT | Performed by: EMERGENCY MEDICINE

## 2020-11-24 PROCEDURE — 87077 CULTURE AEROBIC IDENTIFY: CPT | Performed by: EMERGENCY MEDICINE

## 2020-11-24 PROCEDURE — 71250 CT THORAX DX C-: CPT | Performed by: EMERGENCY MEDICINE

## 2020-11-24 PROCEDURE — 82330 ASSAY OF CALCIUM: CPT | Performed by: EMERGENCY MEDICINE

## 2020-11-24 PROCEDURE — 82375 ASSAY CARBOXYHB QUANT: CPT | Performed by: EMERGENCY MEDICINE

## 2020-11-24 PROCEDURE — 84295 ASSAY OF SERUM SODIUM: CPT | Performed by: EMERGENCY MEDICINE

## 2020-11-24 PROCEDURE — 96375 TX/PRO/DX INJ NEW DRUG ADDON: CPT

## 2020-11-24 PROCEDURE — 85379 FIBRIN DEGRADATION QUANT: CPT | Performed by: EMERGENCY MEDICINE

## 2020-11-24 PROCEDURE — 93005 ELECTROCARDIOGRAM TRACING: CPT

## 2020-11-24 PROCEDURE — 83050 HGB METHEMOGLOBIN QUAN: CPT | Performed by: EMERGENCY MEDICINE

## 2020-11-24 RX ORDER — SODIUM POLYSTYRENE SULFONATE 4.1 MEQ/G
15 POWDER, FOR SUSPENSION ORAL; RECTAL ONCE
Status: DISCONTINUED | OUTPATIENT
Start: 2020-11-24 | End: 2020-11-24

## 2020-11-24 RX ORDER — VANCOMYCIN HYDROCHLORIDE
25 ONCE
Status: COMPLETED | OUTPATIENT
Start: 2020-11-24 | End: 2020-11-25

## 2020-11-24 RX ORDER — SODIUM CHLORIDE, SODIUM LACTATE, POTASSIUM CHLORIDE, CALCIUM CHLORIDE 600; 310; 30; 20 MG/100ML; MG/100ML; MG/100ML; MG/100ML
INJECTION, SOLUTION INTRAVENOUS CONTINUOUS
Status: DISCONTINUED | OUTPATIENT
Start: 2020-11-24 | End: 2020-11-27

## 2020-11-24 RX ORDER — LISINOPRIL 10 MG/1
10 TABLET ORAL DAILY
Status: ON HOLD | COMMUNITY
End: 2020-12-01

## 2020-11-24 RX ORDER — ASPIRIN 81 MG/1
81 TABLET ORAL DAILY
COMMUNITY

## 2020-11-24 RX ORDER — ATORVASTATIN CALCIUM 20 MG/1
20 TABLET, FILM COATED ORAL NIGHTLY
Refills: 3 | Status: DISCONTINUED | OUTPATIENT
Start: 2020-11-24 | End: 2020-12-02

## 2020-11-24 RX ORDER — SODIUM CHLORIDE 9 MG/ML
1000 INJECTION, SOLUTION INTRAVENOUS ONCE
Status: COMPLETED | OUTPATIENT
Start: 2020-11-24 | End: 2020-11-24

## 2020-11-24 RX ORDER — SODIUM CHLORIDE 9 MG/ML
INJECTION, SOLUTION INTRAVENOUS CONTINUOUS
Status: DISCONTINUED | OUTPATIENT
Start: 2020-11-24 | End: 2020-11-24

## 2020-11-24 RX ORDER — DEXTROSE MONOHYDRATE 25 G/50ML
50 INJECTION, SOLUTION INTRAVENOUS ONCE
Status: COMPLETED | OUTPATIENT
Start: 2020-11-24 | End: 2020-11-24

## 2020-11-24 RX ORDER — GABAPENTIN 300 MG/1
300 CAPSULE ORAL 3 TIMES DAILY PRN
COMMUNITY
End: 2020-12-06

## 2020-11-24 NOTE — ED PROVIDER NOTES
Patient Seen in: [facility] Emergency Department      History   Patient presents with:  Difficulty Breathing    Stated Complaint: fall, sob,     HPI    This is an 80-year-old male with past medical history of hypertension, high cholesterol, prostate [facility]   • HERNIA SURGERY     • LEFT LASER-ASSISTED CATARACT SURGERY WITH PHACOEMULSIFICATION WITH POSTERIOR CHAMBER LENS IMPLANTATION Left 2/4/2015    Performed by Lencho Campbell MD at [address] [place] [address]   • OTHER SURGICAL HISTORY  2001    XRT x9wks f retractions, and no wheezing. HEART:  Regular rate and rhythm. S1 and S2. No murmurs, no rubs or gallops. ABDOMEN: Soft, nontender and nondistended. Normoactive bowel sounds. No rebound. No guarding. EXTREMITIES: No lower extremity edema.   Warm with b Abnormal; Notable for the following components:    Neutrophil Absolute Manual 26.10 (*)     Monocyte Absolute Manual 1.50 (*)     Metamyelocyte Absolute Manual 0.60 (*)     All other components within normal limits   ABG PANEL W ELECT AND LACTATE - Abnorma RAINBOW DRAW LIGHT GREEN   RAINBOW DRAW GOLD   BLOOD CULTURE   BLOOD CULTURE   URINE CULTURE, ROUTINE     EKG    Rate, intervals and axes as noted on EKG Report. Rate: 95  Rhythm: Sinus Rhythm  Reading: Nonspecific T wave changes.                 Ct Brai chest, abdomen, and pelvis. Dose reduction techniques were used. Dose information is transmitted to the [facility]iology) NRDR ([address]) which includes the Dose Index Registry.   PATIENT STATED HISTORY: (As transc PELVIC NODES:  No adenopathy. PELVIC ORGANS:  No visible mass. Pelvic organs appropriate for patient age. 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   There is mild dextroscoliosis of lumbar spine silhouette is mildly more prominent. Increased patchy right perihilar consolidation. No pneumothorax is identified. CONCLUSION:   Increased left perihilar moderate consolidation and patchy right perihilar consolidation is noted.   No pneumothor negative. . proBNP 2331. INR 1.25. Lactic acid 1.6. Urinalysis via straight cath was cloudy, moderate blood, large leuk esterase, 21-50 WBCs, greater than 10 RBCs and no bacteria     Hassan catheter was placed. Draining cloudy urine.   Approxim

## 2020-11-24 NOTE — ED INITIAL ASSESSMENT (HPI)
Pt presents to ED with EMS. Family states pt fell down stairs 1 week ago and had fractured ribs, now more lethargic today.

## 2020-11-25 ENCOUNTER — APPOINTMENT (OUTPATIENT)
Dept: ULTRASOUND IMAGING | Facility: HOSPITAL | Age: 85
DRG: 871 | End: 2020-11-25
Attending: PHYSICIAN ASSISTANT
Payer: MEDICARE

## 2020-11-25 PROCEDURE — 84100 ASSAY OF PHOSPHORUS: CPT | Performed by: UROLOGY

## 2020-11-25 PROCEDURE — 84300 ASSAY OF URINE SODIUM: CPT | Performed by: INTERNAL MEDICINE

## 2020-11-25 PROCEDURE — 92610 EVALUATE SWALLOWING FUNCTION: CPT

## 2020-11-25 PROCEDURE — 85027 COMPLETE CBC AUTOMATED: CPT | Performed by: UROLOGY

## 2020-11-25 PROCEDURE — 83735 ASSAY OF MAGNESIUM: CPT | Performed by: UROLOGY

## 2020-11-25 PROCEDURE — 93010 ELECTROCARDIOGRAM REPORT: CPT | Performed by: INTERNAL MEDICINE

## 2020-11-25 PROCEDURE — 84540 ASSAY OF URINE/UREA-N: CPT | Performed by: INTERNAL MEDICINE

## 2020-11-25 PROCEDURE — 82570 ASSAY OF URINE CREATININE: CPT | Performed by: INTERNAL MEDICINE

## 2020-11-25 PROCEDURE — 93005 ELECTROCARDIOGRAM TRACING: CPT

## 2020-11-25 PROCEDURE — 80053 COMPREHEN METABOLIC PANEL: CPT | Performed by: UROLOGY

## 2020-11-25 PROCEDURE — 76775 US EXAM ABDO BACK WALL LIM: CPT | Performed by: PHYSICIAN ASSISTANT

## 2020-11-25 RX ORDER — DILTIAZEM HYDROCHLORIDE 60 MG/1
60 TABLET, FILM COATED ORAL EVERY 8 HOURS SCHEDULED
Status: DISCONTINUED | OUTPATIENT
Start: 2020-11-25 | End: 2020-11-28

## 2020-11-25 RX ORDER — HEPARIN SODIUM 5000 [USP'U]/ML
5000 INJECTION, SOLUTION INTRAVENOUS; SUBCUTANEOUS EVERY 8 HOURS SCHEDULED
Status: DISCONTINUED | OUTPATIENT
Start: 2020-11-25 | End: 2020-11-25

## 2020-11-25 NOTE — SLP NOTE
ADULT SWALLOWING EVALUATION    ASSESSMENT    ASSESSMENT/OVERALL IMPRESSION:  Patient seen for swallowing evaluation due to concern for aspiration. Patient admitted due to lethargy and weakness.   He recently fell and was found to have sustained rib fractur List  Principal Problem:    Septic shock ([facility] [address]  Active Problems:    Acute renal failure, unspecified acute renal failure type (HCC)    Hyperkalemia    Metabolic acidosis      Past Medical History  Past Medical History:   Diagnosis Date   • Actinic keratiti ORAL MOTOR EXAMINATION  Dentition: Upper dentures; Lower partials  Symmetry: Within Functional Limits(questionable very mild right lingual deviation)  Strength:  Within Functional Limits  Tone: Within Functional Limits  Range of Motion: Within Functional L questions, please contact Taya Pitts MS CCC-SLP  Pager [phone]    Prior to entering room, SLP donned appropriate PPE for Patient level of isolation including gloves, eyewear, surgical mask. Patient was not masked.

## 2020-11-25 NOTE — CM/SW NOTE
11/25/20 1100   CM/SW Referral Data   Referral Source    Reason for Referral Discharge planning   Informant Patient; Children   Patient Info   Patient's Mental Status Alert;Oriented   Patient's Home Environment House   Patient lives with Spou

## 2020-11-25 NOTE — PLAN OF CARE
Patient arrived to unit @ 2055 with son at bedside. On NRB mask with sats 100%, replaced with [address] @ 15l and titrated down as tolerated to 8L by morning. Neuro intact, disoriented to place, reoriented frequently.  Dr. Rain Hawthorne, Dr. Anthony Yoon and Dr. Shauna Sampson

## 2020-11-25 NOTE — CM/SW NOTE
Patient and son are requesting caregiver list d/t patients spouse having Alzheimers. Caregiver list given to patient. He is having his family also look at their long term care insurance as well.

## 2020-11-25 NOTE — CONSULTS
[address] [place] [address]    Initial Pharmacokinetic Consult for Vancomycin Dosing     Betsy Castro is a 80year old patient who is being treated for pneumonia.   Pharmacy has been asked to dose Vancomycin by Dr. Fidel Lehman    Allergies:  Amoxicillin    Cu

## 2020-11-25 NOTE — PROGRESS NOTES
[facility] Pharmacy Note:  Renal Adjustment for meropenem (MERREM)    Jose Antonio Echevarria is a 80year old patient who has been prescribed meropenem (MERREM) 500 mg every 8 hrs.   CrCl is estimated creatinine clearance is 8.8 mL/min (A) (based on SCr of 4.99 mg/dL (H)

## 2020-11-25 NOTE — H&P
[facility] Hospitalist History and Physical      Patient presents with:  Difficulty Breathing       PCP: Virginia Kellogg MD      History of Present Illness: Patient is a 80year old male with PMH sig for BPH, CKD, and prostate CA s/p EBRT c/b urethral stricture s/p d OR   • CYSTOSCOPY URETEROSCOPY Left 2/3/2017    Performed by Gisell Laguna MD at [address] [place] [address] Right 9/19/2017    Performed by Camila Oconnell MD at [facility] 21     • LEFT LASER-ASSISTED CATARACT ODESSA Years: 20.00        Pack years: 36        Types: Cigars        Quit date: 1966        Years since quittin.8      Smokeless tobacco: Never Used      Tobacco comment: quit 40 years ago    Alcohol use: Yes      Comment: 3-4x a week 1-2 drinks Lat Chest (mfj=06864)    Result Date: 11/19/2020  DATE OF SERVICE: 11.19.2020 XR RIBS, UNILATERAL (2 VIEWS), RIGHT (CPT=71100), XR CHEST PA + LAT CHEST (RDJ=01502) CLINICAL INFORMATION: Fall down stairs, initial encounter. COMPARISON STUDY: None.  TECHNIQUE without focal mass or consolidation and there is no pleural effusion or pneumothorax. Complex retrocardiac opacity with areas of gas are consistent with a large hiatal hernia. IMPRESSION:  1.  Moderately displaced right lateral eighth, ninth and 10th rib 12/01/2019, 5:43 PM.  INDICATIONS:  fall, sob,  TECHNIQUE:  Following oral contrast administration, unenhanced multislice CT scanning is performed through the chest, abdomen, and pelvis. Dose reduction techniques were used.  Dose information is transmitted the colon without evidence of acute diverticulitis. ABDOMINAL WALL:  No mass or hernia. URINARY BLADDER:  Mild to moderate bladder wall thickening is noted. PELVIC NODES:  No adenopathy. PELVIC ORGANS:  No visible mass.   Pelvic organs appropriate for pat ribs, now more lethargic today. FINDINGS:   Left perihilar consolidation is increased. Left small pleural effusion is suggested. No pneumothorax. Cardiac silhouette is mildly more prominent. Increased patchy right perihilar consolidation.   No pneumo control  - wean O2 as tolerated     # Septic shock due to aspiration PNA and complicated UTI  - cont IVFs  - pressors if needed  - appreciate pulm/CC recs  - cont empiric meropenem and vanco  - cont marie drainage    # Acute kidney injury with hyperkalemia

## 2020-11-25 NOTE — CONSULTS
[facility]  Report of Consultation    Tammie Dias Patient Status:  Inpatient    3/23/1934 MRN [id number]   [facility] 6NE-A Attending Bandar Holley MD   Hosp Day # 0 PCP Toyin Sousa MD     Reason for Consultation:  Hydronephrosis w Performed by Cyntha Primrose, MD at [address] [place] [address]   • HERNIA SURGERY     • LEFT LASER-ASSISTED CATARACT SURGERY WITH PHACOEMULSIFICATION WITH POSTERIOR CHAMBER LENS IMPLANTATION Left 2/4/2015    Performed by Saba Overton MD at [address] [place] [address] 10:29 AM    PSA <0.014 04/01/2019 08:35 AM    PSA <0.01 (L) 10/20/2018 11:44 AM    PSA <0.10 04/17/2018 08:47 AM    PSA 0.13 10/31/2017 10:03 AM    PSA 0.71 07/24/2017 08:57 AM    PSA 0.69 04/19/2017 08:35 AM    PSA 0.65 10/19/2016 10:08 AM    PSA 0.5 04/2 (benign prostatic hyperplasia)     Hyperlipidemia     Prostate cancer [facility])     ED (erectile dysfunction)     PVD (posterior vitreous detachment), bilateral     Retinal pigment epithelial mottling of macula     Abnormal Heart Score CT     Hydronephrosis, u

## 2020-11-25 NOTE — CONSULTS
[facility] & [address] Patient Status:  Inpatient    3/23/1934 MRN [id number]   [facility] 6NE-A Attending Elaina Hawthorne MD   Hosp Day # 1 PCP Mario Aquino MD     Date of Admission: 2020  Admission Diagnosis: Hyperkalemia [ [facility] MAIN OR   • CYSTOSCOPY URETEROSCOPY Left 2/3/2017    Performed by Epifanio Piper MD at [address]   • [address] Right 9/19/2017    Performed by Tito Galvin MD at [place] [address]     • LEFT LASER-ASSISTED VANITA total) by mouth daily. , Disp: 90 tablet, Rfl: 3    •  Labetalol HCl 100 MG Oral Tab, Take 1 tablet (100 mg total) by mouth 2 (two) times daily. , Disp: 180 tablet, Rfl: 3    •  simvastatin 40 MG Oral Tab, Take 1 tablet (40 mg total) by mouth nightly., Disp: 11/25/2020    HCT 29.5 11/25/2020    MCV 89.7 11/25/2020    MCH 29.5 11/25/2020    MCHC 32.9 11/25/2020    RDW 13.9 11/25/2020    .0 11/25/2020     Lab Results   Component Value Date     11/25/2020    K 4.7 11/25/2020     11/25/2020    C consulted  - consider echo  5. F/E/N- NPO until after swallow evaluation; hydration, trend lytes  6. Proph- SC hep  7. Dispo- Full code  - pt is critically ill and at risk for further deteriation. Cont with ICU care.     Critical Care Time greater than: 45

## 2020-11-25 NOTE — CONSULTS
[facility] Cardiology Consultation    Tammie Dias Patient Status:  Inpatient    3/23/1934 MRN [id number]   [facility] 6NE-A Attending Bandar Holley MD   Hosp Day # 1 PCP Toyin Sousa MD     Reason for Consultation:  Atrial Fibrillation      His Rosanne Vang MD at [facility]   • OTHER SURGICAL HISTORY  2001    XRT x9wks for CaP   • OTHER SURGICAL HISTORY  1/10/17    Cysto-   • RIGHT LASER-ASSISTED CATARACT SURGERY WITH PHACOEMULSIFICATION WITH POSTERIOR CHAMBER LENS IMPLANTATIO carotids 2+ no bruits. Cardiac: Irregular S1S2. No S3, S4, rub, click. No murmur. Lungs: Clear to auscultation and percussion. Abdomen: Soft, non-tender. Extremities: No LE edema.   No clubbing or cyanosis  Neurologic: Alert and oriented, normal affe hydration    3.  HR control: po cardizem    4. echo          Bettye Colmenares  11/25/2020  9:30 AM

## 2020-11-25 NOTE — CONSULTS
[facility]    Report of Consultation    Austyn Ma Patient Status:  Inpatient    3/23/1934 MRN [id number]   [facility] 6NE-A Attending Derrek Bello, DO   Hosp Day # 1 PCP Leif Lazo MD     Date of consult: 2020    AMOR HISTORY  2001    XRT x9wks for CaP   • OTHER SURGICAL HISTORY  1/10/17    Cysto-   • RIGHT LASER-ASSISTED CATARACT SURGERY WITH PHACOEMULSIFICATION WITH POSTERIOR CHAMBER LENS IMPLANTATION Right 2/18/2015    Performed by Shaun Blank MD at [facility] S rhythm   Lungs: CTAB   Abdomen: Soft, non-tender, non-distended   : No CVA tenderness  Extremities: no leg edema  Neurologic/Psych:  no asterixis  Skin: No rashes    LABORATORY DATA:       Lab Results   Component Value Date     (H) 11/25/2020    B PROUR 100  (A) 11/24/2020    UROBILINOGEN <2.0 11/24/2020    NITRITE Negative 11/24/2020    LEUUR Large (A) 11/24/2020    WBCUR 21-50 (A) 11/24/2020    RBCUR >10 (A) 11/24/2020    EPIUR None Seen 11/24/2020    BACUR None Seen 11/24/2020    HYLUR Present (A diarrhea x 1d. On arrive to ED was hypotensive to 80s/50s and hypoxic requiring 15L HFNC. CT a bl hydro.   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

## 2020-11-25 NOTE — PROGRESS NOTES
[facility]  Urology Progress Note    Charity Santiago Patient Status:  Inpatient    3/23/1934 MRN [id number]   [facility] 6NE-A Attending Raudel Vargas MD   Hosp Day # 1 PCP Juancho Mathur MD     Subjective:   Charity Santiago is a(n) 80 yea temp, UOP  -CPM with marie catheter for likely NGB  -Renal US to re-assess hydronephrosis  -May consider stents or nephrostomy tubes pending clinical course, renal US results.     Dianne Palumbo P.A.-C  [facility] Urology  11/25/2020  9:06 AM

## 2020-11-25 NOTE — CONSULTS
Vancomycin 1.5 gram IV x1 dose given last night. Discussed with Dr. Aline Sanders. Low suspicion for MRSA and given CATHY, will not continue vancomycin at this time. Pending cultures/sensitivities, vancomycin will be re-ordered if needed.     Deon Libman, ValerieD

## 2020-11-25 NOTE — PROGRESS NOTES
Received patient alert and oriented,forgetful to time and place reoriented easily  Lungs diminished bilaterally  Weaned 02 to 5 Liters nasal canula  Up to commode with max assist  IVF per mar  Will monitor

## 2020-11-26 ENCOUNTER — APPOINTMENT (OUTPATIENT)
Dept: GENERAL RADIOLOGY | Facility: HOSPITAL | Age: 85
DRG: 871 | End: 2020-11-26
Attending: INTERNAL MEDICINE
Payer: MEDICARE

## 2020-11-26 PROCEDURE — 80048 BASIC METABOLIC PNL TOTAL CA: CPT | Performed by: INTERNAL MEDICINE

## 2020-11-26 PROCEDURE — 97161 PT EVAL LOW COMPLEX 20 MIN: CPT

## 2020-11-26 PROCEDURE — 71045 X-RAY EXAM CHEST 1 VIEW: CPT | Performed by: INTERNAL MEDICINE

## 2020-11-26 PROCEDURE — 84100 ASSAY OF PHOSPHORUS: CPT | Performed by: INTERNAL MEDICINE

## 2020-11-26 PROCEDURE — 97110 THERAPEUTIC EXERCISES: CPT

## 2020-11-26 PROCEDURE — 85025 COMPLETE CBC W/AUTO DIFF WBC: CPT | Performed by: INTERNAL MEDICINE

## 2020-11-26 PROCEDURE — 83735 ASSAY OF MAGNESIUM: CPT | Performed by: INTERNAL MEDICINE

## 2020-11-26 PROCEDURE — 85027 COMPLETE CBC AUTOMATED: CPT | Performed by: INTERNAL MEDICINE

## 2020-11-26 PROCEDURE — 85007 BL SMEAR W/DIFF WBC COUNT: CPT | Performed by: INTERNAL MEDICINE

## 2020-11-26 RX ORDER — MELATONIN
3 NIGHTLY PRN
Status: DISCONTINUED | OUTPATIENT
Start: 2020-11-26 | End: 2020-12-02

## 2020-11-26 NOTE — PLAN OF CARE
Pt received alert/oriented to self and place, reoriented pt to month and situation. Pt forgetful at times. Pt CURRY, following all commands. Pt denies any pain or SOB. Pt lungs coarse bilaterally, diminished bases L>R.  Pt has non productive very congested co

## 2020-11-26 NOTE — PROGRESS NOTES
[facility] Cardiology Progress Note        Lloyd Frederick Patient Status:  Inpatient    3/23/1934 MRN [id number]   [facility] 6NE-A Attending Valerie Mims, DO   Hosp Day # 2 PCP Leena Corado MD     Subjective:  Guillermo Kasper 6. 7* 8.4* 8.3*   MG  --   --  2.7* 2.0   PHOS  --   --  4.5 2.3*   * 136* 103* 97       Recent Labs   Lab 11/24/20  1713 11/25/20  0529   ALT 49 42   AST 38* 36   ALB 2.8* 2.4*       Recent Labs   Lab 11/24/20 1713   TROP <0.045     190

## 2020-11-26 NOTE — PROGRESS NOTES
Pulmonary Progress Note        NAME: Leena Martin - ROOM: 2474/5922-Z - MRN: [id number] - Age: 80year old - : 3/23/1934        Last 24hrs: No events overnight, no complaints this AM    OBJECTIVE:   20  0500 20  0600 20  0700  baseline creat ~ 1.7; due to dehydration and component of hydro  - with asst hyperkalemia  - s/p marie placement  - renal following  - indices are close to baseline  -  following for possible need for perc nephrostomy tubes if hydro doesn't improve  4.  a

## 2020-11-26 NOTE — PROGRESS NOTES
[facility] Hospitalist Progress Note                                                                   [facility] & [address]  3/23/1934    SUBJECTIVE:  Pt seen and examined.   States he is feeling better, Acute renal failure, unspecified acute renal failure type (HCC)    Hyperkalemia    Metabolic acidosis      80 yr old male with PMH sig for BPH, CKD, and prostate CA s/p EBRT c/b urethral stricture s/p dilation 12/2019 followed by repeat dilation 19 days ag

## 2020-11-26 NOTE — PHYSICAL THERAPY NOTE
PHYSICAL THERAPY EVALUATION - INPATIENT     Room Number: 0336/8194-A  Evaluation Date: 11/26/2020  Type of Evaluation: Initial  Physician Order: PT Eval and Treat    Presenting Problem: s/p fall on stairs, hypoxic  Reason for Therapy: Mobility Dysfun SURGERY WITH PHACOEMULSIFICATION WITH POSTERIOR CHAMBER LENS IMPLANTATION Left 2/4/2015    Performed by Guanaco Euceda MD at [facility]   • OTHER SURGICAL HISTORY  2001    XRT x9wks for CaP   • OTHER SURGICAL HISTORY  1/10/17    Duo- (including adjusting bedclothes, sheets and blankets)?: None   -   Sitting down on and standing up from a chair with arms (e.g., wheelchair, bedside commode, etc.): A Little   -   Moving from lying on back to sitting on the side of the bed?: None   How muc Alternating marching  Ankle pumps  LAQ  Transfer training    Patient End of Session: Up in chair;Needs met;Call light within reach;RN aware of session/findings; All patient questions and concerns addressed;SCDs in place; Alarm set    ASSESSMENT   Patient is flight of stairs with use of handrail and supervision. Goal #5    Goal #6    Goal Comments: Goals established on 11/26/2020    PT donned gloves, mask and face shield throughout session.

## 2020-11-26 NOTE — PROGRESS NOTES
Pharmacy Note: Renal dose adjustment   Wanda De was originally prescribed Merrem which was renally dose adjusted at the time of the original order per P&T approved renal dosing protocol. Renal function has now improved.     Estimated Creatinine Daisy

## 2020-11-26 NOTE — PROGRESS NOTES
[facility]    Nephrology Progress Note    Hettie Dubin Attending:  Rocío Lee DO     Cc: keira    SUBJECTIVE     No complaints. Feeling well.      PHYSICAL EXAM   Vital signs: /64   Pulse 84   Temp 98.9 °F (37.2 °C) (Temporal)   Resp 16 (CPT=71045), 11/24/2020,     5:22 PM.         INDICATIONS:  resp failure, PNA         PATIENT STATED HISTORY: (As transcribed by Technologist)                 FINDINGS:  Patchy consolidative changes within the bilateral infrahilar     lower lobe regions ar CYSTS/STONES/MASSES:  None. BLADDER:  Hassan catheter decompressing the urinary bladder limiting     evaluation. OTHER:  Negative.                               =====    CONCLUSION:           1.  Decreased now mild right and decreased now mil MD Neil on 11/24/2020 at 7:16 PM         Finalized by (CST): Jade Ames MD on 11/24/2020 at 7:19 PM        CT CHEST+ABDOMEN+PELVIS(CPT=71250/15985)   Final Result    PROCEDURE:  CT CHEST+ABDOMEN+PELVIS(CPT=71250/74679)         COMPARISON:  [facility] , CT, CT lesion, fluid collection, ductal dilatation, or atrophy. SPLEEN:  Calcified granulomas are noted in the spleen. KIDNEYS:  Marked dilatation of bilateral renal collecting systems and     moderate dilatation of bilateral ureters is again noted.     AD This is similar to prior exam.                Dictated by (CST): Lora Miranda MD on 11/24/2020 at 7:21 PM         Finalized by (CST): Lora Miranda MD on 11/24/2020 at 7:42 PM        XR CHEST AP PORTABLE  (CPT=71045)   Final Result    PROCEDURE: Urology on consult. Repeat US w improving hydro  -- Cr improving. Excellent UOP  -- continue IVFs and marie  -- hold home lisinopril   -- avoid nsaids, IV contrast, other nephrotoxins.  Renally dose meds  -- no acute indication for dialysis      Hyperkalemi

## 2020-11-26 NOTE — PROGRESS NOTES
[facility]  Urology Progress Note    Jesús Bailey Patient Status:  Inpatient    3/23/1934 MRN [id number]   [facility] 6NE-A Attending Criss Lu MD   Hosp Day # 2 PCP Yaya Escamilla MD     Subjective:   Jesús Bailey is a(n) 80 yea

## 2020-11-27 ENCOUNTER — APPOINTMENT (OUTPATIENT)
Dept: CV DIAGNOSTICS | Facility: HOSPITAL | Age: 85
DRG: 871 | End: 2020-11-27
Attending: INTERNAL MEDICINE
Payer: MEDICARE

## 2020-11-27 PROCEDURE — 85007 BL SMEAR W/DIFF WBC COUNT: CPT | Performed by: INTERNAL MEDICINE

## 2020-11-27 PROCEDURE — 85025 COMPLETE CBC W/AUTO DIFF WBC: CPT | Performed by: INTERNAL MEDICINE

## 2020-11-27 PROCEDURE — 80048 BASIC METABOLIC PNL TOTAL CA: CPT | Performed by: INTERNAL MEDICINE

## 2020-11-27 PROCEDURE — 93306 TTE W/DOPPLER COMPLETE: CPT | Performed by: INTERNAL MEDICINE

## 2020-11-27 PROCEDURE — 97116 GAIT TRAINING THERAPY: CPT

## 2020-11-27 PROCEDURE — 83735 ASSAY OF MAGNESIUM: CPT | Performed by: INTERNAL MEDICINE

## 2020-11-27 PROCEDURE — 97530 THERAPEUTIC ACTIVITIES: CPT

## 2020-11-27 PROCEDURE — 97110 THERAPEUTIC EXERCISES: CPT

## 2020-11-27 PROCEDURE — 85027 COMPLETE CBC AUTOMATED: CPT | Performed by: INTERNAL MEDICINE

## 2020-11-27 PROCEDURE — 84100 ASSAY OF PHOSPHORUS: CPT | Performed by: INTERNAL MEDICINE

## 2020-11-27 NOTE — CM/SW NOTE
Per Nain, patient accepted by [address]  phone  [phone]  Fax  [phone]

## 2020-11-27 NOTE — PROGRESS NOTES
[facility] Cardiology Progress Note        Rachelskye Vasquez Patient Status:  Inpatient    3/23/1934 MRN [id number]   [facility] 6NE-A Attending Chloe Tian, DO   Hosp Day # 3 PCP Steffany Woodruff MD     Subjective:  Lucila Ngo 111* 64* 34*   CREATSERUM 7.40* 4.99* 4.17* 1.92* 1.27   CA 8.6 6.7* 8.4* 8.3* 8.6   MG  --   --  2.7* 2.0  --    PHOS  --   --  4.5 2.3*  --    * 136* 103* 97 97       Recent Labs   Lab 11/24/20  1713 11/25/20  0529   ALT 49 42   AST 38* 36   ALB 2

## 2020-11-27 NOTE — PROGRESS NOTES
[facility]  Urology Progress Note    Al Michele Patient Status:  Inpatient    3/23/1934 MRN [id number]   [facility] 8NE-A Attending Eileen Bauman, DO   Hosp Day # 3 PCP Esmer Tamayo MD     Subjective:   Al Michele is a(n)

## 2020-11-27 NOTE — PROGRESS NOTES
[facility]    Nephrology Progress Note    Yan Steel Attending:  Amber Galaviz DO     Cc: keira    SUBJECTIVE     No complaints. Feeling well.      PHYSICAL EXAM   Vital signs: /65 (BP Location: Right arm)   Pulse 82   Temp 97.9 °F (36.6 was obtained.          COMPARISON:  [facility] , XR, XR CHEST AP PORTABLE  (CPT=71045), 11/24/2020,     5:22 PM.         INDICATIONS:  resp failure, PNA         PATIENT STATED HISTORY: (As transcribed by Technologist)                 FINDINGS:  Patchy consolida which appears     decreased when compared to the previous CT    CYSTS/STONES/MASSES:  None. BLADDER:  Hassan catheter decompressing the urinary bladder limiting     evaluation.          OTHER:  Negative.                               =====    CONCLUS the deep white matter.               Dictated by (CST): Lukas Powell MD on 11/24/2020 at 7:16 PM         Finalized by (CST): Lukas Powell MD on 11/24/2020 at 7:19 PM        CT CHEST+ABDOMEN+PELVIS(CPT=71250/20338)   Final Result    PROCEDURE:  CT CHEST+ABDOMEN+ visible dilatation or calcification. PANCREAS:  No lesion, fluid collection, ductal dilatation, or atrophy. SPLEEN:  Calcified granulomas are noted in the spleen.     KIDNEYS:  Marked dilatation of bilateral renal collecting systems and     modera ureters is likely related to bladder wall     thickening.   This is similar to prior exam.                Dictated by (CST): Al Tena MD on 11/24/2020 at 7:21 PM         Finalized by (CST): Al Tena MD on 11/24/2020 at 7:42 PM        XR that as well  -- CT ap w marked bl hydro. Marie placed. Urology on consult. Repeat US w improving hydro  -- Cr improving. Excellent UOP  -- ap IVFs and marie. Ok to stop IVFs  -- hold home lisinopril   -- avoid nsaids, IV contrast, other nephrotoxins.  Drake Dickinsons

## 2020-11-27 NOTE — PROGRESS NOTES
[facility] Hospitalist Progress Note                                                                   [facility] & [address]  3/23/1934    SUBJECTIVE:  Pt seen and examined. Transferred out of ICU.   Sta Infusions:   • lactated ringers 75 mL/hr at 11/27/20 0504     PRN:     Assessment/Plan:  Principal Problem:    Septic shock (HCC)  Active Problems:    Acute renal failure, unspecified acute renal failure type (HCC)    Hyperkalemia    Metabolic acidosis

## 2020-11-27 NOTE — PLAN OF CARE
Pt denies c/o pain. Lung sounds coarse bilaterally, on 3L NC O2. IV Merrem infusing per eMAR. +non-productive, strong cough. NSR and pAF on telemetry. SCDs on. Eliquis dose given. HR controlled. Continent of bowel.  Hassan in place, draining yellow and c weights  Outcome: Progressing  Goal: Absence of cardiac arrhythmias or at baseline  Description: INTERVENTIONS:  - Continuous cardiac monitoring, monitor vital signs, obtain 12 lead EKG if indicated  - Evaluate effectiveness of antiarrhythmic and heart rat meals, perform LE exercises and ambulate with RW and staff  Outcome: Progressing

## 2020-11-27 NOTE — PROGRESS NOTES
[facility] & [address] Patient Status:  Inpatient    3/23/1934 MRN [id number]   [facility] 8NE-A Attending Maddie Mcgrath, DO   Hosp Day # 3 PCP Patrizia Lange MD     Pulm / Critical Care Progress Note     S: pt states he is fe (mg/dL)   Date Value   05/02/2016 0.75 (L)   05/11/2015 0.74 (L)   06/06/2014 0.75 (L)     Creatinine (mg/dL)   Date Value   11/27/2020 1.27   11/26/2020 1.92 (H)   11/25/2020 4.17 (H)   11/09/2020 1.82 (H)   09/22/2020 1.73 (H)   03/09/2020 1.31 (H)   ]

## 2020-11-27 NOTE — PHYSICAL THERAPY NOTE
PHYSICAL THERAPY TREATMENT NOTE - INPATIENT    Room Number: 0489/7552-W     Session: 1  Number of Visits to Meet Established Goals: 5    Presenting Problem: s/p fall on stairs, hypoxic    Problem List  Principal Problem:    Septic shock ([address]  [facility]  Will I get better?  It make me angry that I cannot run anymore  [facility]    Patient’s self-stated goal is: home and walk better    OBJECTIVE  Precautions: None    WEIGHT BEARING RESTRICTION  Weight Bearin balance has been declining for the past 2 years. performed standing exes with RW as support limited with overall decrease task tolerance.  Left on the recliner and discussed with pt the importance of mobilities while in hosp and recommendation for WENDY place able to demonstrate transfers Sit to/from Stand at assistance level: modified independent      Goal #3 Patient is able to ambulate 150 feet with assist device: walker - rolling at assistance level: modified independent      Goal #4 Patient is able to negot

## 2020-11-27 NOTE — PLAN OF CARE
Assumed patient care at 0730 this AM. Patient A&O x4, forgetful at times, glasses. SPO2 weaned to 1L O2, will continue to wean as tolerated. Pt denies SOB, reports mild-moderate MALAVE. Receiving IV Merrem Q12 for pna.  NSR with frequent PACs on tele, on Eliqu medications as ordered  - Initiate emergency measures for life threatening arrhythmias  - Monitor electrolytes and administer replacement therapy as ordered  Outcome: Progressing     Problem: RESPIRATORY - ADULT  Goal: Achieves optimal ventilation and oxyg

## 2020-11-27 NOTE — PLAN OF CARE
Pt doing well, afebrile, urine output 1300ml for the day. O2 requirements weaned to [facility]. Cardiac rhythm stable in SR with frequent pacs. Vital signs stable. . Pt transferring to room 8607. Report called to [facility].

## 2020-11-28 PROCEDURE — 83735 ASSAY OF MAGNESIUM: CPT | Performed by: INTERNAL MEDICINE

## 2020-11-28 PROCEDURE — 80069 RENAL FUNCTION PANEL: CPT | Performed by: INTERNAL MEDICINE

## 2020-11-28 PROCEDURE — 84100 ASSAY OF PHOSPHORUS: CPT | Performed by: INTERNAL MEDICINE

## 2020-11-28 PROCEDURE — 94667 MNPJ CHEST WALL 1ST: CPT

## 2020-11-28 RX ORDER — DILTIAZEM HYDROCHLORIDE 240 MG/1
240 CAPSULE, COATED, EXTENDED RELEASE ORAL DAILY
Status: DISCONTINUED | OUTPATIENT
Start: 2020-11-28 | End: 2020-12-01

## 2020-11-28 NOTE — PROGRESS NOTES
[facility]    Nephrology Progress Note    Falguni Rush Attending:  Meghan Dillard DO     Cc: keira    SUBJECTIVE     No complaints. Feeling well.  Po intake not great    PHYSICAL EXAM   Vital signs: /77 (BP Location: Right arm)   Pulse 100 radiograph was obtained.          COMPARISON:  [facility] , XR, XR CHEST AP PORTABLE  (CPT=71045), 11/24/2020,     5:22 PM.         INDICATIONS:  resp failure, PNA         PATIENT STATED HISTORY: (As transcribed by Technologist)                 FINDINGS:  Patch noted which appears     decreased when compared to the previous CT    CYSTS/STONES/MASSES:  None. BLADDER:  Hassan catheter decompressing the urinary bladder limiting     evaluation.          OTHER:  Negative.                               =====    C within the deep white matter.               Dictated by (CST): Ivan Duckworth MD on 11/24/2020 at 7:16 PM         Finalized by (CST): Ivan Duckworth MD on 11/24/2020 at 7:19 PM        CT CHEST+ABDOMEN+PELVIS(CPT=71250/29840)   Final Result    PROCEDURE:  CT CHEST+A BILIARY:  No visible dilatation or calcification. PANCREAS:  No lesion, fluid collection, ductal dilatation, or atrophy. SPLEEN:  Calcified granulomas are noted in the spleen.     KIDNEYS:  Marked dilatation of bilateral renal collecting systems a of the ureters is likely related to bladder wall     thickening.   This is similar to prior exam.                Dictated by (CST): Anum Dean MD on 11/24/2020 at 7:21 PM         Finalized by (CST): Anum Dean MD on 11/24/2020 at 7:42 PM top of that as well  -- CT ap w marked bl hydro. Marie placed. Urology on consult. Repeat US w improving hydro  -- Cr improving. Excellent UOP  -- sp IVFs, now off.  Continue marie per urology  -- hold home lisinopril   -- avoid nsaids, IV contrast, other n

## 2020-11-28 NOTE — PROGRESS NOTES
[facility]  Urology Progress Note    Timothy Gens Patient Status:  Inpatient    3/23/1934 MRN [id number]   [facility] 8NE-A Attending Macy Lino, DO   Hosp Day # 4 PCP Jody Simpson MD     Subjective:   Timothy Gens is a(n)

## 2020-11-28 NOTE — PROGRESS NOTES
[facility] Cardiology Progress Note        Charity Santiago Patient Status:  Inpatient    3/23/1934 MRN [id number]   [facility] 6NE-A Attending Bradley Cheema DO   Hosp Day # 4 PCP Juancho Mathur MD     Subjective:  Abdias Guevara 1.16   CA 6.7* 8.4* 8.3* 8.6 8.6   MG  --  2.7* 2.0 1.6 1.6   PHOS  --  4.5 2.3* 2.0* 1.9*   * 103* 97 97 95       Recent Labs   Lab 11/24/20  1713 11/25/20  0529 11/28/20  0711   ALT 49 42  --    AST 38* 36  --    ALB 2.8* 2.4* 2.6*       Recent La

## 2020-11-28 NOTE — PLAN OF CARE
Assumed care at 1900, pt resting in bed. A&Ox4. Denies SOB, O2 sat WNL on RA. NSR on tele. Hassan in place per urology. Crea 1.27. IV merrem given q12hrs. IV fluids d/c. Bed alarm on, call light in reach. Will continue to monitor.      O2 sat remained at 88% Continuous cardiac monitoring, monitor vital signs, obtain 12 lead EKG if indicated  - Evaluate effectiveness of antiarrhythmic and heart rate control medications as ordered  - Initiate emergency measures for life threatening arrhythmias  - Monitor electro

## 2020-11-28 NOTE — PROGRESS NOTES
[facility] PULMONARY/CRITICAL CARE    S: No new events overnight.     Meds:  • dilTIAZem HCl ER Coated Beads  240 mg Oral Daily   • metoprolol tartrate  25 mg Oral 2x Daily(Beta Blocker)   • meropenem  500 mg Intravenous Q12H   • apixaban  2.5 mg Oral BID   • ator *   < > 143 142 141 141   K 6.1*   < > 4.7 4.5 4.0 3.8      < > 117* 115* 113* 109   CO2 17.0*   < > 19.0* 23.0 23.0 24.0   ALKPHO 129*  --  116  --   --   --    AST 38*  --  36  --   --   --    ALT 49  --  42  --   --   --    BILT 0.5  --  0 Hgb < 7  6. Proph- SC hep  7. Dispo- Full code  - will follow peripherally over the weekend, call with questions. Irina Pitts M.D.   Pulmonary/Critical Care and Sleep Medicine

## 2020-11-28 NOTE — PLAN OF CARE
Patient is alert and oriented, forgetful and easily irritable. Up to chair and ambulating with one assist and walker. Upon ambulating to bathroom HR elevated 130s, at rest 90s; cardiologist notified, oral metoprolol tartrate 25mg BID ordered.  Family at bed arrhythmias or at baseline  Description: INTERVENTIONS:  - Continuous cardiac monitoring, monitor vital signs, obtain 12 lead EKG if indicated  - Evaluate effectiveness of antiarrhythmic and heart rate control medications as ordered  - Initiate emergency m staff  Outcome: Progressing

## 2020-11-28 NOTE — PROGRESS NOTES
[address]  Hospitalist Progress Note                                                                   [facility] & [address]  3/23/1934    SUBJECTIVE:  No chest pain, palpitations, shortness of breath, c Continuous Infusions:     PRN:     Assessment/Plan:  Principal Problem:    Septic shock (HCC)  Active Problems:    Acute renal failure, unspecified acute renal failure type (HCC)    Hyperkalemia    Metabolic acidosis      80 yr old male with PMH sig fo

## 2020-11-29 PROCEDURE — 80069 RENAL FUNCTION PANEL: CPT | Performed by: INTERNAL MEDICINE

## 2020-11-29 PROCEDURE — 97166 OT EVAL MOD COMPLEX 45 MIN: CPT

## 2020-11-29 PROCEDURE — 97535 SELF CARE MNGMENT TRAINING: CPT

## 2020-11-29 PROCEDURE — 85027 COMPLETE CBC AUTOMATED: CPT | Performed by: HOSPITALIST

## 2020-11-29 PROCEDURE — 85025 COMPLETE CBC W/AUTO DIFF WBC: CPT | Performed by: HOSPITALIST

## 2020-11-29 PROCEDURE — 92526 ORAL FUNCTION THERAPY: CPT

## 2020-11-29 PROCEDURE — 85007 BL SMEAR W/DIFF WBC COUNT: CPT | Performed by: HOSPITALIST

## 2020-11-29 PROCEDURE — 83735 ASSAY OF MAGNESIUM: CPT | Performed by: INTERNAL MEDICINE

## 2020-11-29 RX ORDER — MAGNESIUM SULFATE HEPTAHYDRATE 40 MG/ML
2 INJECTION, SOLUTION INTRAVENOUS ONCE
Status: COMPLETED | OUTPATIENT
Start: 2020-11-29 | End: 2020-11-29

## 2020-11-29 RX ORDER — LISINOPRIL 10 MG/1
10 TABLET ORAL DAILY
Status: DISCONTINUED | OUTPATIENT
Start: 2020-11-30 | End: 2020-11-30

## 2020-11-29 NOTE — PROGRESS NOTES
[facility] Hospitalist Progress Note                                                                   [facility] & [address]  3/23/1934    SUBJECTIVE:  No chest pain, palpitations, shortness of breath, c Coated Beads  240 mg Oral Daily   • metoprolol tartrate  25 mg Oral 2x Daily(Beta Blocker)   • meropenem  500 mg Intravenous Q12H   • apixaban  2.5 mg Oral BID   • atorvastatin  20 mg Oral Nightly     Continuous Infusions:     PRN:     Assessment/Plan:  Pr could consider surgical repair if recurrent aspiration events continue    DVT prophy - eliquis  Dispo: inpt care.   unlilkely discharge today, follow other consult rec including PT    Pina Hackett M.D.  [facility] Hospitalist  Pager: [phone]

## 2020-11-29 NOTE — PROGRESS NOTES
[facility]  Urology Progress Note    Leena Martin Patient Status:  Inpatient    3/23/1934 MRN [id number]   [facility] 8NE-A Attending Karen Molina MD   [facility] Day # 5 PCP Yeimy Blanchard MD     Subjective:   Leena Martin is a(n) 80 y Urology  11/29/2020  5:58 AM

## 2020-11-29 NOTE — PROGRESS NOTES
[facility] Cardiology Progress Note        Stefan Ware Patient Status:  Inpatient    3/23/1934 MRN [id number]   [facility] 6NE-A Attending Rox Ko, DO   Hosp Day # 5 PCP Barbara Bonner MD     Subjective: 24.0   * 64* 34* 22*  --  20*   CREATSERUM 4.17* 1.92* 1.27 1.16  --  1.21   CA 8.4* 8.3* 8.6 8.6  --  8.5   MG 2.7* 2.0 1.6 1.6  --  1.5*   PHOS 4.5 2.3* 2.0* 1.9* 2.9 2.3*   * 97 97 95  --  106*       Recent Labs   Lab 11/24/20  1713 11/25/

## 2020-11-29 NOTE — OCCUPATIONAL THERAPY NOTE
OCCUPATIONAL THERAPY EVALUATION - INPATIENT     Room Number: 5211/2336-K  Evaluation Date: 11/29/2020  Type of Evaluation: Initial  Presenting Problem: septic shock    Physician Order: IP Consult to Occupational Therapy  Reason for Therapy: ADL/IADL Dysfun SURGERY WITH PHACOEMULSIFICATION WITH POSTERIOR CHAMBER LENS IMPLANTATION Left 2/4/2015    Performed by Meryle Guarneri, MD at [address] [place] [address]   • OTHER SURGICAL HISTORY  2001    XRT x9wks for CaP   • OTHER SURGICAL HISTORY  1/10/17    Duo- Motor    WFL    Fine Motor    WFL      ADDITIONAL TESTS                                    NEUROLOGICAL FINDINGS                   ACTIVITY TOLERANCE                         O2 SATURATIONS                ACTIVITIES OF DAILY LIVING ASSESSMENT  AM-PAC ‘6-Cli participation. The patient is functioning below his previous functional level and would benefit from skilled inpatient OT to address the above deficits, maximizing patient’s ability to return safely to his prior level of function.     Patient Complexity

## 2020-11-29 NOTE — PLAN OF CARE
Denies c/o pain, malaise, or cardiac symptoms. Hassan managed by Urology. Remains in NSR, NL S1, S2, RRR. Remains completely alert x 4, yet couldn't tell me who the president was. Definitely some forgetfulness noted.   Was able to answer everything else Monitor vital signs, rhythm, and trends  - Monitor for bleeding, hypotension and signs of decreased cardiac output  - Evaluate effectiveness of vasoactive medications to optimize hemodynamic stability  - Monitor arterial and/or venous puncture sites for bl pharmacy to review patient's medication profile  - Implement strategies to promote bladder emptying  Outcome: Progressing     Problem: Impaired Functional Mobility  Goal: Achieve highest/safest level of mobility/gait  Description: Interventions:  - Assess

## 2020-11-29 NOTE — SLP NOTE
SPEECH DAILY NOTE - INPATIENT    ASSESSMENT & PLAN   ASSESSMENT  Pt seen for dysphagia tx to assess tolerance of recommended [facility] soft chopped thin diet. Pt seen with several trials of thin liquid via cup with no overt s/s of aspiration noted.   Pt seen wi

## 2020-11-29 NOTE — PROGRESS NOTES
[facility]    Nephrology Progress Note    Orpha La Attending:  Rosie Hunt DO     Cc: keira    SUBJECTIVE     No complaints. Feeling well.  Po intake not great    PHYSICAL EXAM   Vital signs: /63 (BP Location: Right arm)   Pulse 72 11/29/2020       IMAGING   All imaging studies personally reviewed. XR CHEST AP PORTABLE  (CPT=71045)   Final Result    PROCEDURE:  XR CHEST AP PORTABLE  (CPT=71045)         TECHNIQUE:  AP chest radiograph was obtained.          COMPARISON:  JUNIOR CLAY, the largest in the lower pole measuring 31 x 29 x 29 mm.          LEFT KIDNEY     MEASUREMENTS:  11.5 x 5.0 x 4.4 cm    ECHOGENICITY:  Normal.    HYDRONEPHROSIS:  Mild-to-moderate left hydronephrosis noted which appears     decreased when compared to the pr Atherosclerosis.                               =====    CONCLUSION:  1. No acute intracranial hemorrhage. 2. Findings most consistent with chronic small vessel ischemic change     within the deep white matter.               Dictated by (CST AORTA:  No aneurysm or dissection. VASCULATURE:  No visible pulmonary arterial thrombus or attenuation. ABDOMEN/PELVIS:    LIVER:  Calcified granulomas are noted in the liver. BILIARY:  No visible dilatation or calcification.       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         6. Marked bilateral hydronephrosis, left greater than right along with     moderate dilatation of the ureters is likely related to bladder wall     thi requiring 15L HFNC. CT a bl hydro.  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  Fo

## 2020-11-29 NOTE — PLAN OF CARE
Problem: Impaired Activities of Daily Living  Goal: Achieve highest/safest level of independence in self care  Description: Interventions:  - Assess ability and encourage patient to participate in ADLs to maximize function  - Promote sitting position [facility]

## 2020-11-29 NOTE — PLAN OF CARE
Assumed care of patient at 1900. Pt A/Ox4, slightly forgetful. VSS. NSR. Room air maintaining O2 sats. Pt has marie in place by urology, draining well. Some pain in ribs from rib fractures, declines pain medication. Used acapella x4, no cough illicited. baseline  Description: INTERVENTIONS:  - Continuous cardiac monitoring, monitor vital signs, obtain 12 lead EKG if indicated  - Evaluate effectiveness of antiarrhythmic and heart rate control medications as ordered  - Initiate emergency measures for life t Progressing

## 2020-11-30 PROCEDURE — 85027 COMPLETE CBC AUTOMATED: CPT | Performed by: HOSPITALIST

## 2020-11-30 PROCEDURE — 85025 COMPLETE CBC W/AUTO DIFF WBC: CPT | Performed by: HOSPITALIST

## 2020-11-30 PROCEDURE — 97530 THERAPEUTIC ACTIVITIES: CPT | Performed by: PHYSICAL THERAPIST

## 2020-11-30 PROCEDURE — 80069 RENAL FUNCTION PANEL: CPT | Performed by: INTERNAL MEDICINE

## 2020-11-30 PROCEDURE — 97110 THERAPEUTIC EXERCISES: CPT | Performed by: PHYSICAL THERAPIST

## 2020-11-30 PROCEDURE — 85007 BL SMEAR W/DIFF WBC COUNT: CPT | Performed by: HOSPITALIST

## 2020-11-30 PROCEDURE — 83735 ASSAY OF MAGNESIUM: CPT | Performed by: INTERNAL MEDICINE

## 2020-11-30 PROCEDURE — 97116 GAIT TRAINING THERAPY: CPT | Performed by: PHYSICAL THERAPIST

## 2020-11-30 NOTE — PLAN OF CARE
Problem: Patient/Family Goals  Goal: Patient/Family Long Term Goal  Description: Patient's Long Term Goal: To go home    Interventions:  - See additional Care Plan goals for specific interventions  Outcome: Progressing  Goal: Patient/Family Short Term Go Progressing     Problem: RESPIRATORY - ADULT  Goal: Achieves optimal ventilation and oxygenation  Description: INTERVENTIONS:  - Assess for changes in respiratory status  - Assess for changes in mentation and behavior  - Position to facilitate oxygenation patient/family in tolerated functional activity level and precautions during self-care    Outcome: Progressing

## 2020-11-30 NOTE — CM/SW NOTE
Attempted to meet with son regarding therapy recommendations for WENDY--currently on the phone, will return later

## 2020-11-30 NOTE — PROGRESS NOTES
[facility] & [address] Patient Status:  Inpatient    3/23/1934 MRN [id number]   [facility] 8NE-A Attending Karen Molina MD   [facility] Day # 6 PCP Yeimy Blanchard MD     SUBJECTIVE: no acute events overnight. c/o mild rib pain where f Assessment and Plan     1. Acute hypoxic resp failure - due to asp PNA and difficulty clearing secretions after recent rib fx- also has some background emphysema from remote h/o smoking  - wean O2 as tolerated - desaturates during sleep.   Requiring 2L nc

## 2020-11-30 NOTE — PLAN OF CARE
Assumed care at 1900, pt resting in bed. A&Ox4; forgetful. Denies SOB, O2 sat WNL on RA when awake. When sleeping, O2 sat drops below 87%. O2 sat monitored, pt desaturated <87% for 5+ minutes, 2L applied, O2 sat remained WNL. NSR on tele.  Mo in place d/ hemodynamic stability  - Monitor arterial and/or venous puncture sites for bleeding and/or hematoma  - Assess quality of pulses, skin color and temperature  - Assess for signs of decreased coronary artery perfusion - ex.  Angina  - Evaluate fluid balance, a and perform bladder scan as needed  - Follow urinary retention protocol/standard of care  - Consider collaborating with pharmacy to review patient's medication profile  - Implement strategies to promote bladder emptying  11/30/2020 0138 by Shashank Kingsley,

## 2020-11-30 NOTE — CM/SW NOTE
Spoke with patient's son nato (cell ) to discuss current therapy recommendations for WENDY at discharge. Son and patient in agreement for rehab--son requeste that I speak to his spouse Mila Reina as she was researching facilities.   Explained to b

## 2020-11-30 NOTE — PROGRESS NOTES
[facility]    Nephrology Progress Note    Lloyd Frederick Attending:  Roselyn Huizar MD       SUBJECTIVE:     No LH, cp, sob, leg swelling  Tolerating marie    PHYSICAL EXAM:     Vital Signs: BP 97/46 (BP Location: Right arm)   Pulse 64   Temp 98 °F LYMPHABS 1.34 11/30/2020    EOSABS 0.00 11/30/2020    BASABS 0.00 11/30/2020    NEUT 85 11/30/2020    LYMPH 8 11/30/2020    MON 5 11/30/2020    EOS 0 11/30/2020    BASO 0 11/30/2020    NEPERCENT 46.6 03/09/2020    LYPERCENT 38.5 03/09/2020    MOPERCENT 14. Cr 7.5mg/dL     CATHY on CKD3B, severe but improved  --  and Cr 7.5mg/dL on admit  -- baseline Cr ~1.7mg/dL --> 1.14 mg/dl today  -- CATHY 2/2 obstructive uropathy, also likely prerenal on top of that as well  -- CT ap w marked bl hydro. Hassan placed.  U

## 2020-11-30 NOTE — PHYSICAL THERAPY NOTE
PHYSICAL THERAPY TREATMENT NOTE - INPATIENT    Room Number: 3142/3848-S     Session: 2  Number of Visits to Meet Established Goals: 5    Presenting Problem: (s/p fall down stairs , septic shock )    Problem List  Principal Problem:    Septic shock ([address] MD at [facility] not sure if I can go home by myself. My strength is bad. My bed is up on the second floor, and my wife has Alzheimer's, so really cant help me out at home. Im not sure what I need to do .      Patient’s during session . Sit<>stand with min A needs cueing with appropriate hand placement and body mechanics. Gt training with min A unsteady limited to 50' with min SOB  , HR increase to 76.    Noted with kyphosis, and cueing for overall appropriate use of RW, i noted his O2 sat  was 95. Spoke to pt ,dtr, and son  on recommendation for WENDY If  home needs 24/7 assist until back to baseline. Wife has dementia or Alzheimer's per son. Per son pt balance has been declining for the past 2 years. Pt will benefit from anaid

## 2020-11-30 NOTE — PROGRESS NOTES
[facility] Hospitalist Progress Note                                                                   [facility] & [address]  3/23/1934    SUBJECTIVE:  No chest pain, palpitations, shortness of breath, c mg Oral Daily   • metoprolol tartrate  25 mg Oral 2x Daily(Beta Blocker)   • meropenem  500 mg Intravenous Q12H   • apixaban  2.5 mg Oral BID   • atorvastatin  20 mg Oral Nightly     Continuous Infusions:     PRN:     Assessment/Plan:  Principal Problem: discharge home, if not pending WENDY to be set up     # Large hiatal hernia  - may have contributed to his aspiration PNA  - outpt f/u per pulm, could consider surgical repair if recurrent aspiration events continue    DVT prophy - eliquis  Dispo: inpt care.

## 2020-11-30 NOTE — PROGRESS NOTES
11/29/20 2246 11/29/20 2247 11/29/20 2248   Oxygen Therapy   SpO2 (!) 86 % (!) 87 % (!) 87 %   O2 Device None (Room air) None (Room air) None (Room air)   O2 Flow Rate (L/min)  --   --   --       11/29/20 2249 11/29/20 2250 11/29/20 2253   Oxygen Therap

## 2020-11-30 NOTE — PROGRESS NOTES
[facility] [address] [place] [address] Cardiology Progress Note        Dickie Simmonds Patient Status:  Inpatient    3/23/1934 MRN [id number]   [facility] 6NE-A Attending Lyle Christianson DO   Hosp Day # 6 PCP Kathe Ross MD     Subjective: 115* 113* 109  --  107 107   CO2 23.0 23.0 24.0  --  24.0 24.0   BUN 64* 34* 22*  --  20* 21*   CREATSERUM 1.92* 1.27 1.16  --  1.21 1.14   CA 8.3* 8.6 8.6  --  8.5 7.9*   MG 2.0 1.6 1.6  --  1.5* 2.0   PHOS 2.3* 2.0* 1.9* 2.9 2.3* 2.7   GLU 97 97 95  --

## 2020-12-01 PROCEDURE — 85025 COMPLETE CBC W/AUTO DIFF WBC: CPT | Performed by: HOSPITALIST

## 2020-12-01 PROCEDURE — 85027 COMPLETE CBC AUTOMATED: CPT | Performed by: HOSPITALIST

## 2020-12-01 PROCEDURE — 82962 GLUCOSE BLOOD TEST: CPT

## 2020-12-01 PROCEDURE — 83735 ASSAY OF MAGNESIUM: CPT | Performed by: INTERNAL MEDICINE

## 2020-12-01 PROCEDURE — 85007 BL SMEAR W/DIFF WBC COUNT: CPT | Performed by: HOSPITALIST

## 2020-12-01 PROCEDURE — 80069 RENAL FUNCTION PANEL: CPT | Performed by: INTERNAL MEDICINE

## 2020-12-01 RX ORDER — DILTIAZEM HYDROCHLORIDE 120 MG/1
120 CAPSULE, COATED, EXTENDED RELEASE ORAL DAILY
Qty: 30 CAPSULE | Refills: 5 | Status: SHIPPED | OUTPATIENT
Start: 2020-12-02 | End: 2021-02-03

## 2020-12-01 RX ORDER — ASPIRIN 81 MG/1
81 TABLET ORAL DAILY
Status: DISCONTINUED | OUTPATIENT
Start: 2020-12-01 | End: 2020-12-02

## 2020-12-01 RX ORDER — DILTIAZEM HYDROCHLORIDE 120 MG/1
120 CAPSULE, EXTENDED RELEASE ORAL DAILY
Status: DISCONTINUED | OUTPATIENT
Start: 2020-12-02 | End: 2020-12-02

## 2020-12-01 NOTE — CM/SW NOTE
The patient and son have selected the kira--sherita message sent for facility to start insurance auth

## 2020-12-01 NOTE — PLAN OF CARE
Pt has been stable today, antibiotics infusion in progress, good appetite. Up to chair for meals. Son updated with plan of care and possible discharge tomorrow once WENDY placement is secured.  Pt denies any pain, saturating at high 90's on room air during th

## 2020-12-01 NOTE — CM/SW NOTE
Call received from daughter in law Kimberly Acharya, updated her of accepting facilities--to provide sherita print out to son in patient's room so they can review and select so to start insurance authorization

## 2020-12-01 NOTE — CM/SW NOTE
Received call from son manoj, wish to change rehab facility to [facility]--updated thomas @ [facility]; await insurance auth

## 2020-12-01 NOTE — PLAN OF CARE
Pt is A+Ox4. Occasionally forgetful. VSS on RA-2L at night. NSR to SB on tele. Eliquis. Hassan in place for retention-voiding trial outpatient with Urology. No c.o pain or discomfort at this time. Up SB with a walker.  Awaiting insurance authorization at T INTERVENTIONS:  - Continuous cardiac monitoring, monitor vital signs, obtain 12 lead EKG if indicated  - Evaluate effectiveness of antiarrhythmic and heart rate control medications as ordered  - Initiate emergency measures for life threatening arrhythmias Impaired Activities of Daily Living  Goal: Achieve highest/safest level of independence in self care  Description: Interventions:  - Assess ability and encourage patient to participate in ADLs to maximize function  - Promote sitting position while performi

## 2020-12-01 NOTE — PROGRESS NOTES
[facility] & [address] Patient Status:  Inpatient    3/23/1934 MRN [id number]   [facility] 8NE-A Attending Ifeoma Low MD   [facility] Day # 7 PCP Thea Dunn MD     Pulm / Critical Care Progress Note     S: pt feels well.        Lc Allen 26*     Creatinine, Serum (mg/dL)   Date Value   05/02/2016 0.75 (L)   05/11/2015 0.74 (L)   06/06/2014 0.75 (L)     Creatinine (mg/dL)   Date Value   12/01/2020 1.24   11/30/2020 1.14   11/29/2020 1.21   11/09/2020 1.82 (H)   09/22/2020 1.73 (H)   03/09/2

## 2020-12-01 NOTE — PROGRESS NOTES
[facility] Hospitalist Progress Note                                                                   [facility] & [address]  3/23/1934    SUBJECTIVE:  No chest pain, palpitations, shortness of breath, c --    ALB 2.8* 2.4* 2.6* 2.6* 2.5* 2.5*       Recent Labs   Lab 11/24/20  1908 12/01/20  1004   PGLU 221* 127*       Meds:   Scheduled:   • [START ON 12/2/2020] dilTIAZem HCl ER Coated Beads  120 mg Oral Daily   • aspirin  81 mg Oral Daily   • metoprolol t active infection  -improved  -no further inpt evaluation at this time  -clinically monitor    # Binswanger's disease   # Peripheral neuropathy   - follows with neuro  - gabapentin on hold due to renal dysfunction, can likely resume at discharge      # Weak

## 2020-12-01 NOTE — PLAN OF CARE
Received patient at [address]. AAOx4. Tele rhythm NSR. O2 saturation 92% on RA. Breath sounds clear. Bed is locked in low position. Call light and personal items in reach. No c/o chest pain or SOB. Patient marie intact, marie care done. Skin dry and intact.  Rev Continuous cardiac monitoring, monitor vital signs, obtain 12 lead EKG if indicated  - Evaluate effectiveness of antiarrhythmic and heart rate control medications as ordered  - Initiate emergency measures for life threatening arrhythmias  - Monitor electro of Daily Living  Goal: Achieve highest/safest level of independence in self care  Description: Interventions:  - Assess ability and encourage patient to participate in ADLs to maximize function  - Promote sitting position while performing ADLs such as feed

## 2020-12-01 NOTE — PROGRESS NOTES
[facility]    Nephrology Progress Note    Rachel Vasquez Attending:  Kristina Garcia MD       SUBJECTIVE:   No issues with marie  No LH/orthostatic symptoms    PHYSICAL EXAM:     Vital Signs: /51 (BP Location: Right arm)   Pulse 66   Temp 97.8 LYMPHABS 0.74 (L) 12/01/2020    EOSABS 0.00 12/01/2020    BASABS 0.00 12/01/2020    NEUT 81 12/01/2020    LYMPH 6 12/01/2020    MON 8 12/01/2020    EOS 0 12/01/2020    BASO 0 12/01/2020    NEPERCENT 46.6 03/09/2020    LYPERCENT 38.5 03/09/2020    MOPERCENT hypoxic requiring 15L HFNC. CT a bl hydro.   and Cr 7.5mg/dL     CATHY on CKD3B, severe but improved  --  and Cr 7.5mg/dL on admit  -- baseline Cr ~1.7mg/dL --> 1.24 mg/dl today  -- CATHY 2/2 obstructive uropathy, also likely prerenal on top of th

## 2020-12-01 NOTE — PROGRESS NOTES
[facility] Cardiology Progress Note        Austyn Ma Patient Status:  Inpatient    3/23/1934 MRN [id number]   [facility] 6NE-A Attending Derrek Bello DO   Hosp Day # 7 PCP Leif Lazo MD     Subjective:  141  --  139 137 139   K 4.0 3.8  --  3.7 3.5 3.5   * 109  --  107 107 110   CO2 23.0 24.0  --  24.0 24.0 24.0   BUN 34* 22*  --  20* 21* 26*   CREATSERUM 1.27 1.16  --  1.21 1.14 1.24   CA 8.6 8.6  --  8.5 7.9* 7.8*   MG 1.6 1.6  --  1.5* 2

## 2020-12-02 VITALS
HEART RATE: 67 BPM | WEIGHT: 116.19 LBS | TEMPERATURE: 99 F | BODY MASS INDEX: 20.08 KG/M2 | OXYGEN SATURATION: 95 % | HEIGHT: 63.78 IN | DIASTOLIC BLOOD PRESSURE: 56 MMHG | SYSTOLIC BLOOD PRESSURE: 91 MMHG | RESPIRATION RATE: 18 BRPM

## 2020-12-02 PROCEDURE — 85027 COMPLETE CBC AUTOMATED: CPT | Performed by: HOSPITALIST

## 2020-12-02 PROCEDURE — 80069 RENAL FUNCTION PANEL: CPT | Performed by: INTERNAL MEDICINE

## 2020-12-02 PROCEDURE — 83735 ASSAY OF MAGNESIUM: CPT | Performed by: INTERNAL MEDICINE

## 2020-12-02 PROCEDURE — 85007 BL SMEAR W/DIFF WBC COUNT: CPT | Performed by: HOSPITALIST

## 2020-12-02 PROCEDURE — 85025 COMPLETE CBC W/AUTO DIFF WBC: CPT | Performed by: HOSPITALIST

## 2020-12-02 RX ORDER — MELATONIN
Qty: 30 TABLET | Refills: 0 | Status: SHIPPED | COMMUNITY
Start: 2020-12-02

## 2020-12-02 RX ORDER — BICALUTAMIDE 50 MG/1
50 TABLET, FILM COATED ORAL DAILY
Qty: 90 TABLET | Refills: 3 | Status: SHIPPED | COMMUNITY
Start: 2020-12-09 | End: 2021-03-24

## 2020-12-02 RX ORDER — LEVOFLOXACIN 750 MG/1
750 TABLET ORAL DAILY
Qty: 1 TABLET | Refills: 0 | Status: SHIPPED | COMMUNITY
Start: 2020-12-03 | End: 2020-12-03

## 2020-12-02 NOTE — PROGRESS NOTES
Pulmonary Progress Note      NAME: Al Michele - ROOM: [facility]/5811-N - MRN: [id number] - Age: 80year old - : 3/23/1934    Assessment/Plan:  1.  Acute hypoxic resp failure - due to asp PNA and difficulty clearing secretions after recent rib fx- also has CREATSERUM 1.14 1.24 1.07   GFRAA 67 60 72   GFRNAA 58* 52* 63   CA 7.9* 7.8* 8.6   ALB 2.5* 2.5* 2.4*    139 139   K 3.5 3.5 3.7    110 110   CO2 24.0 24.0 24.0       Imaging: I independently visualized all relevant chest imaging in PACS,

## 2020-12-02 NOTE — PROGRESS NOTES
NURSING DISCHARGE NOTE    Discharged Rehab facility via Wheelchair. Accompanied by Family member and Support staff  Belongings Taken by patient/family. Pt is assessed and ready for discharge.  Instructions and follow ups gone over with patient, so [Brushes teeth, no help] : brushes teeth, no help [Plays board/card games] : plays board/card games [Mature pencil grasp] : mature pencil grasp [Draws person with 6 parts] : draws person with 6 parts [Prints some letters and numbers] : prints some letters and numbers [Copies square and triangle] : copies square and triangle [Balances on one foot 5-6 seconds] : balances on one foot 5-6 seconds [Heel-to-toe walk] : heel to toe walk [Good articulation and language skills] : good articulation and language skills [Counts to 10] : counts to 10 [Names 4+ colors] : names 4+ colors [Follows simple directions] : follows simple directions [Defines 5-7 words] : defines 5-7 words [Listens and attends] : listens and attends [Knows 2 opposites] : knows 2 opposites [Knows 3 adjectives] : knows 3 adjectives [Prepares cereal] : does not prepare cereal [Able to tie knot] : not able to tie knot

## 2020-12-02 NOTE — CM/SW NOTE
Spoke with [facility]--able to accept pt--assigned to room 123. Nurse to call report to [phone]. and send d/c paperwork.   Shan Balderas arranged for 1130 am (PCS completed) aware of cost Home

## 2020-12-02 NOTE — PLAN OF CARE
Patient alert and oriented x4. Vital signs stable, NSR/Sinus genoveva on telemetry. On 2L nasal cannula at night saturating at 90%. Lung sounds clear. Patient denies any pain. Hassan in place draining clear yellow urine.  Call light within reach, will continue fluid balance, assess for edema, trend weights  12/2/2020 0146 by Dejuan Mahmood RN  Outcome: Progressing    Goal: Absence of cardiac arrhythmias or at baseline  Description: INTERVENTIONS:  - Continuous cardiac monitoring, monitor vital signs, obtain Assess patient's functional ability and stability  - Promote increasing activity/tolerance for mobility and gait  - Educate and engage patient/family in tolerated activity level and precautions  - Up in chair for meals, perform LE exercises and ambulate wi

## 2020-12-02 NOTE — DISCHARGE SUMMARY
[facility] Patient Status:  Inpatient    3/23/1934 MRN [id number]   [facility] 8NE-A Attending Sachin Betancourt MD   [facility] Day # 8 PCP Yaya Escamilla MD     Date of Admission: 2020  Date of Dis Quantity: 60 tablet  Refills: 5     dilTIAZem HCl ER Coated Beads 120 MG Cp24  Commonly known as: CARDIZEM CD      Take 1 capsule (120 mg total) by mouth daily.    Quantity: 30 capsule  Refills: 5     melatonin 3 MG Tabs       Quantity: 30 tablet  Refills: Where to Get Your Medications      These medications were sent to [facility], [address], [address], [address]    Phone: [phone]   · apixaban 2.5 MG Tabs  · dilTIAZem HC edema or tenderness of the LE  Skin: no new  rashes or lesions  :  Mo in place with james urine   -----------------------------------------------------------------------------------------------    86 yr old male with PMH sig for BPH, CKD, and prostate recurrent aspiration events continue     DVT josey - eliquis  Dispo: discharge to Mel Handley M.D.  [facility] Hospitalist  Pager: [phone]    PATIENT DISCHARGE INSTRUCTIONS: See electronic chart    Time spent:  > 35 minutes

## 2020-12-02 NOTE — PROGRESS NOTES
[facility] Cardiology Progress Note        Sunni Villela Patient Status:  Inpatient    3/23/1934 MRN [id number]   [facility] 6NE-A Attending Maddie Mcgrath, DO   Hosp Day # 8 PCP Patrizia Lange MD     Subjective: 12/02/20  0559     --  139 137 139 139   K 3.8  --  3.7 3.5 3.5 3.7     --  107 107 110 110   CO2 24.0  --  24.0 24.0 24.0 24.0   BUN 22*  --  20* 21* 26* 25*   CREATSERUM 1.16  --  1.21 1.14 1.24 1.07   CA 8.6  --  8.5 7.9* 7.8* 8.6   MG 1.6

## 2020-12-02 NOTE — PLAN OF CARE
Problem: Patient/Family Goals  Goal: Patient/Family Long Term Goal  Description: Patient's Long Term Goal: To go home    Interventions:  - See additional Care Plan goals for specific interventions  Outcome: Adequate for Discharge  Goal: Patient/Family Sh replacement therapy as ordered  Outcome: Adequate for Discharge     Problem: RESPIRATORY - ADULT  Goal: Achieves optimal ventilation and oxygenation  Description: INTERVENTIONS:  - Assess for changes in respiratory status  - Assess for changes in mentation performing ADLs such as feeding, grooming, and bathing  - Educate and encourage patient/family in tolerated functional activity level and precautions during self-care    Outcome: Adequate for Discharge

## 2020-12-03 ENCOUNTER — INITIAL APN SNF VISIT (OUTPATIENT)
Dept: INTERNAL MEDICINE CLINIC | Age: 85
End: 2020-12-03

## 2020-12-03 DIAGNOSIS — N40.0 BENIGN PROSTATIC HYPERPLASIA, UNSPECIFIED WHETHER LOWER URINARY TRACT SYMPTOMS PRESENT: ICD-10-CM

## 2020-12-03 DIAGNOSIS — Z86.19 HISTORY OF SEPSIS: ICD-10-CM

## 2020-12-03 DIAGNOSIS — Z96.0 INDWELLING URINARY CATHETER PRESENT: Primary | ICD-10-CM

## 2020-12-03 PROCEDURE — 3074F SYST BP LT 130 MM HG: CPT | Performed by: NURSE PRACTITIONER

## 2020-12-03 PROCEDURE — 3078F DIAST BP <80 MM HG: CPT | Performed by: NURSE PRACTITIONER

## 2020-12-03 PROCEDURE — 99310 SBSQ NF CARE HIGH MDM 45: CPT | Performed by: NURSE PRACTITIONER

## 2020-12-03 PROCEDURE — 1111F DSCHRG MED/CURRENT MED MERGE: CPT | Performed by: NURSE PRACTITIONER

## 2020-12-04 VITALS
RESPIRATION RATE: 20 BRPM | DIASTOLIC BLOOD PRESSURE: 62 MMHG | HEART RATE: 51 BPM | OXYGEN SATURATION: 93 % | SYSTOLIC BLOOD PRESSURE: 108 MMHG | TEMPERATURE: 97 F

## 2020-12-04 NOTE — PROGRESS NOTES
Betsy Matthew, 3/23/1934, 80year old, male is being discharged from Facility: [address] [place] [address]    Date of Admission: 11/24/20   Date of Discharge: 12/2/20                              Admitting Diagnoses:  Acute hypoxic respiratory failu bilaterally.  No wheezes/rhonchi.  No accessory muscle use. Cardiovascular: S1, S2 regular rate and rhythm.  No murmur appreciated  Abdomen: Soft, nontender, nondistended.  Bowel sounds+  Neuro: No focal deficits  MSK: Moves all extremities  URINARY: niki

## 2020-12-09 PROBLEM — J69.0 ASPIRATION PNEUMONIA, UNSPECIFIED ASPIRATION PNEUMONIA TYPE, UNSPECIFIED LATERALITY, UNSPECIFIED PART OF LUNG (HCC): Status: ACTIVE | Noted: 2020-12-09

## 2020-12-09 PROBLEM — E87.5 HYPERKALEMIA: Status: RESOLVED | Noted: 2020-11-24 | Resolved: 2020-12-09

## 2020-12-09 PROBLEM — E87.20 METABOLIC ACIDOSIS: Status: RESOLVED | Noted: 2020-11-24 | Resolved: 2020-12-09

## 2020-12-09 PROBLEM — E87.2 METABOLIC ACIDOSIS: Status: RESOLVED | Noted: 2020-11-24 | Resolved: 2020-12-09

## 2021-03-02 PROBLEM — N17.9 ACUTE RENAL FAILURE, UNSPECIFIED ACUTE RENAL FAILURE TYPE (HCC): Status: RESOLVED | Noted: 2020-11-24 | Resolved: 2021-03-02

## 2021-03-02 PROBLEM — G63 POLYNEUROPATHY IN DISEASES CLASSIFIED ELSEWHERE (HCC): Status: ACTIVE | Noted: 2021-03-02

## 2021-03-02 PROBLEM — H61.21 IMPACTED CERUMEN OF RIGHT EAR: Status: RESOLVED | Noted: 2020-03-09 | Resolved: 2021-03-02

## 2021-03-02 PROBLEM — I27.20 PULMONARY HYPERTENSION (HCC): Status: RESOLVED | Noted: 2020-10-20 | Resolved: 2021-03-02

## 2021-03-02 PROBLEM — I15.9 SECONDARY HYPERTENSION: Status: RESOLVED | Noted: 2019-11-08 | Resolved: 2021-03-02

## 2021-03-02 PROBLEM — A41.9 SEPTIC SHOCK (HCC): Status: RESOLVED | Noted: 2020-11-24 | Resolved: 2021-03-02

## 2021-03-02 PROBLEM — R65.21 SEPTIC SHOCK (HCC): Status: RESOLVED | Noted: 2020-11-24 | Resolved: 2021-03-02

## 2021-03-02 PROBLEM — C43.61: Status: RESOLVED | Noted: 2017-08-31 | Resolved: 2021-03-02

## 2021-03-02 PROBLEM — J69.0 ASPIRATION PNEUMONIA, UNSPECIFIED ASPIRATION PNEUMONIA TYPE, UNSPECIFIED LATERALITY, UNSPECIFIED PART OF LUNG (HCC): Status: RESOLVED | Noted: 2020-12-09 | Resolved: 2021-03-02

## 2021-03-02 PROBLEM — N13.30 HYDRONEPHROSIS, UNSPECIFIED HYDRONEPHROSIS TYPE: Status: RESOLVED | Noted: 2017-01-26 | Resolved: 2021-03-02

## 2021-03-02 PROBLEM — J96.01 ACUTE RESPIRATORY FAILURE WITH HYPOXIA (HCC): Status: ACTIVE | Noted: 2021-03-02

## 2021-03-02 PROBLEM — J96.01 ACUTE RESPIRATORY FAILURE WITH HYPOXIA (HCC): Status: RESOLVED | Noted: 2021-03-02 | Resolved: 2021-03-02

## 2021-04-13 PROBLEM — L57.0 AK (ACTINIC KERATOSIS): Status: ACTIVE | Noted: 2021-04-13

## 2021-04-13 PROBLEM — L29.89: Status: ACTIVE | Noted: 2021-04-13

## 2021-04-13 PROBLEM — L29.8: Status: ACTIVE | Noted: 2021-04-13

## 2021-12-13 PROBLEM — M25.432 LEFT WRIST EFFUSION: Status: ACTIVE | Noted: 2021-12-13

## 2021-12-18 ENCOUNTER — HOSPITAL ENCOUNTER (INPATIENT)
Facility: HOSPITAL | Age: 86
LOS: 6 days | Discharge: HOME HEALTH | DRG: 872 | End: 2021-12-24
Attending: EMERGENCY MEDICINE | Admitting: HOSPITALIST
Payer: MEDICARE

## 2021-12-18 DIAGNOSIS — L03.114 CELLULITIS OF HAND, LEFT: Primary | ICD-10-CM

## 2021-12-18 DIAGNOSIS — B99.9 INFECTION: ICD-10-CM

## 2021-12-18 PROBLEM — D64.9 ANEMIA: Status: ACTIVE | Noted: 2021-12-18

## 2021-12-18 PROBLEM — R73.9 HYPERGLYCEMIA: Status: ACTIVE | Noted: 2021-12-18

## 2021-12-18 PROBLEM — D72.829 LEUKOCYTOSIS: Status: ACTIVE | Noted: 2021-12-18

## 2021-12-18 PROBLEM — R79.89 AZOTEMIA: Status: ACTIVE | Noted: 2021-12-18

## 2021-12-18 LAB
ALBUMIN SERPL-MCNC: 2.4 G/DL (ref 3.4–5)
ALBUMIN/GLOB SERPL: 0.6 {RATIO} (ref 1–2)
ALP LIVER SERPL-CCNC: 95 U/L
ALT SERPL-CCNC: 38 U/L
ANION GAP SERPL CALC-SCNC: 4 MMOL/L (ref 0–18)
AST SERPL-CCNC: 23 U/L (ref 15–37)
BASOPHILS # BLD: 0 X10(3) UL (ref 0–0.2)
BASOPHILS NFR BLD: 0 %
BILIRUB SERPL-MCNC: 0.4 MG/DL (ref 0.1–2)
BUN BLD-MCNC: 36 MG/DL (ref 7–18)
CALCIUM BLD-MCNC: 9.2 MG/DL (ref 8.5–10.1)
CHLORIDE SERPL-SCNC: 108 MMOL/L (ref 98–112)
CO2 SERPL-SCNC: 26 MMOL/L (ref 21–32)
CREAT BLD-MCNC: 1.47 MG/DL
CRP SERPL-MCNC: 20.1 MG/DL (ref ?–0.3)
EOSINOPHIL # BLD: 0 X10(3) UL (ref 0–0.7)
EOSINOPHIL NFR BLD: 0 %
ERYTHROCYTE [DISTWIDTH] IN BLOOD BY AUTOMATED COUNT: 14.6 %
ERYTHROCYTE [SEDIMENTATION RATE] IN BLOOD: 76 MM/HR
GLOBULIN PLAS-MCNC: 3.9 G/DL (ref 2.8–4.4)
GLUCOSE BLD-MCNC: 131 MG/DL (ref 70–99)
HCT VFR BLD AUTO: 34.5 %
HGB BLD-MCNC: 11.2 G/DL
LYMPHOCYTES NFR BLD: 1.34 X10(3) UL (ref 1–4)
LYMPHOCYTES NFR BLD: 8 %
MCH RBC QN AUTO: 30.1 PG (ref 26–34)
MCHC RBC AUTO-ENTMCNC: 32.5 G/DL (ref 31–37)
MCV RBC AUTO: 92.7 FL
METAMYELOCYTES # BLD: 0.33 X10(3) UL
METAMYELOCYTES NFR BLD: 2 %
MONOCYTES # BLD: 1.34 X10(3) UL (ref 0.1–1)
MONOCYTES NFR BLD: 8 %
MYELOCYTES # BLD: 0.33 X10(3) UL
MYELOCYTES NFR BLD: 2 %
NEUTROPHILS # BLD AUTO: 10.99 X10 (3) UL (ref 1.5–7.7)
NEUTROPHILS NFR BLD: 72 %
NEUTS BAND NFR BLD: 8 %
NEUTS HYPERSEG # BLD: 13.36 X10(3) UL (ref 1.5–7.7)
OSMOLALITY SERPL CALC.SUM OF ELEC: 296 MOSM/KG (ref 275–295)
PLATELET # BLD AUTO: 280 10(3)UL (ref 150–450)
PLATELET MORPHOLOGY: NORMAL
POTASSIUM SERPL-SCNC: 3.7 MMOL/L (ref 3.5–5.1)
PROCALCITONIN SERPL-MCNC: 0.31 NG/ML (ref ?–0.16)
PROT SERPL-MCNC: 6.3 G/DL (ref 6.4–8.2)
RBC # BLD AUTO: 3.72 X10(6)UL
SARS-COV-2 RNA RESP QL NAA+PROBE: NOT DETECTED
SODIUM SERPL-SCNC: 138 MMOL/L (ref 136–145)
TOTAL CELLS COUNTED BLD: 100
WBC # BLD AUTO: 16.7 X10(3) UL (ref 4–11)

## 2021-12-18 PROCEDURE — 96365 THER/PROPH/DIAG IV INF INIT: CPT

## 2021-12-18 PROCEDURE — 96366 THER/PROPH/DIAG IV INF ADDON: CPT

## 2021-12-18 PROCEDURE — 85025 COMPLETE CBC W/AUTO DIFF WBC: CPT | Performed by: EMERGENCY MEDICINE

## 2021-12-18 PROCEDURE — 99285 EMERGENCY DEPT VISIT HI MDM: CPT

## 2021-12-18 PROCEDURE — 86140 C-REACTIVE PROTEIN: CPT | Performed by: HOSPITALIST

## 2021-12-18 PROCEDURE — 85007 BL SMEAR W/DIFF WBC COUNT: CPT | Performed by: EMERGENCY MEDICINE

## 2021-12-18 PROCEDURE — 85652 RBC SED RATE AUTOMATED: CPT | Performed by: HOSPITALIST

## 2021-12-18 PROCEDURE — 85027 COMPLETE CBC AUTOMATED: CPT | Performed by: EMERGENCY MEDICINE

## 2021-12-18 PROCEDURE — 84145 PROCALCITONIN (PCT): CPT | Performed by: HOSPITALIST

## 2021-12-18 PROCEDURE — 80053 COMPREHEN METABOLIC PANEL: CPT | Performed by: EMERGENCY MEDICINE

## 2021-12-18 RX ORDER — TAMSULOSIN HYDROCHLORIDE 0.4 MG/1
0.4 CAPSULE ORAL 2 TIMES DAILY
Status: DISCONTINUED | OUTPATIENT
Start: 2021-12-18 | End: 2021-12-24

## 2021-12-18 RX ORDER — ONDANSETRON 2 MG/ML
4 INJECTION INTRAMUSCULAR; INTRAVENOUS EVERY 4 HOURS PRN
Status: DISCONTINUED | OUTPATIENT
Start: 2021-12-18 | End: 2021-12-18 | Stop reason: ALTCHOICE

## 2021-12-18 RX ORDER — MORPHINE SULFATE 2 MG/ML
1 INJECTION, SOLUTION INTRAMUSCULAR; INTRAVENOUS EVERY 2 HOUR PRN
Status: DISCONTINUED | OUTPATIENT
Start: 2021-12-18 | End: 2021-12-24

## 2021-12-18 RX ORDER — BISACODYL 10 MG
10 SUPPOSITORY, RECTAL RECTAL
Status: DISCONTINUED | OUTPATIENT
Start: 2021-12-18 | End: 2021-12-24

## 2021-12-18 RX ORDER — METOCLOPRAMIDE HYDROCHLORIDE 5 MG/ML
5 INJECTION INTRAMUSCULAR; INTRAVENOUS EVERY 8 HOURS PRN
Status: DISCONTINUED | OUTPATIENT
Start: 2021-12-18 | End: 2021-12-24

## 2021-12-18 RX ORDER — SENNOSIDES 8.6 MG
17.2 TABLET ORAL NIGHTLY PRN
Status: DISCONTINUED | OUTPATIENT
Start: 2021-12-18 | End: 2021-12-24

## 2021-12-18 RX ORDER — MORPHINE SULFATE 2 MG/ML
2 INJECTION, SOLUTION INTRAMUSCULAR; INTRAVENOUS EVERY 2 HOUR PRN
Status: DISCONTINUED | OUTPATIENT
Start: 2021-12-18 | End: 2021-12-24

## 2021-12-18 RX ORDER — ONDANSETRON 2 MG/ML
4 INJECTION INTRAMUSCULAR; INTRAVENOUS EVERY 6 HOURS PRN
Status: DISCONTINUED | OUTPATIENT
Start: 2021-12-18 | End: 2021-12-24

## 2021-12-18 RX ORDER — SODIUM CHLORIDE 9 MG/ML
INJECTION, SOLUTION INTRAVENOUS CONTINUOUS
Status: DISCONTINUED | OUTPATIENT
Start: 2021-12-18 | End: 2021-12-24

## 2021-12-18 RX ORDER — MORPHINE SULFATE 4 MG/ML
4 INJECTION, SOLUTION INTRAMUSCULAR; INTRAVENOUS EVERY 30 MIN PRN
Status: DISPENSED | OUTPATIENT
Start: 2021-12-18 | End: 2021-12-18

## 2021-12-18 RX ORDER — POTASSIUM CHLORIDE 20 MEQ/1
40 TABLET, EXTENDED RELEASE ORAL ONCE
Status: COMPLETED | OUTPATIENT
Start: 2021-12-18 | End: 2021-12-18

## 2021-12-18 RX ORDER — TRAMADOL HYDROCHLORIDE 50 MG/1
50 TABLET ORAL EVERY 8 HOURS PRN
Status: DISCONTINUED | OUTPATIENT
Start: 2021-12-18 | End: 2021-12-24

## 2021-12-18 RX ORDER — BICALUTAMIDE 50 MG/1
50 TABLET, FILM COATED ORAL DAILY
Status: DISCONTINUED | OUTPATIENT
Start: 2021-12-18 | End: 2021-12-24

## 2021-12-18 RX ORDER — PRAVASTATIN SODIUM 20 MG
20 TABLET ORAL NIGHTLY
Refills: 3 | Status: DISCONTINUED | OUTPATIENT
Start: 2021-12-18 | End: 2021-12-18

## 2021-12-18 RX ORDER — ACETAMINOPHEN 325 MG/1
650 TABLET ORAL EVERY 6 HOURS PRN
Status: DISCONTINUED | OUTPATIENT
Start: 2021-12-18 | End: 2021-12-24

## 2021-12-18 RX ORDER — MORPHINE SULFATE 4 MG/ML
4 INJECTION, SOLUTION INTRAMUSCULAR; INTRAVENOUS EVERY 2 HOUR PRN
Status: DISCONTINUED | OUTPATIENT
Start: 2021-12-18 | End: 2021-12-24

## 2021-12-18 RX ORDER — MELATONIN
3 NIGHTLY PRN
Status: DISCONTINUED | OUTPATIENT
Start: 2021-12-18 | End: 2021-12-24

## 2021-12-18 RX ORDER — ATORVASTATIN CALCIUM 20 MG/1
20 TABLET, FILM COATED ORAL NIGHTLY
Status: DISCONTINUED | OUTPATIENT
Start: 2021-12-18 | End: 2021-12-24

## 2021-12-18 RX ORDER — ASPIRIN 81 MG/1
81 TABLET ORAL DAILY
Status: DISCONTINUED | OUTPATIENT
Start: 2021-12-19 | End: 2021-12-24

## 2021-12-18 RX ORDER — CEFAZOLIN SODIUM/WATER 2 G/20 ML
2 SYRINGE (ML) INTRAVENOUS EVERY 12 HOURS
Status: DISCONTINUED | OUTPATIENT
Start: 2021-12-19 | End: 2021-12-19

## 2021-12-18 RX ORDER — POLYETHYLENE GLYCOL 3350 17 G/17G
17 POWDER, FOR SOLUTION ORAL DAILY PRN
Status: DISCONTINUED | OUTPATIENT
Start: 2021-12-18 | End: 2021-12-24

## 2021-12-18 NOTE — CONSULTS
[facility] ID CONSULT NOTE    Jermainarkisha Albrecht Patient Status:  Observation    3/23/1934 MRN [id number]   [facility] 3NE-A Attending Chan De Jesus MD   Hosp Day # 0 PCP Ravi Farrell MD       Reason for Consultation:  L hand c LEFT LASER-ASSISTED CATARACT SURGERY WITH PHACOEMULSIFICATION WITH POSTERIOR CHAMBER LENS IMPLANTATION;  Surgeon:  Tri Anderson MD;  Location: [address] [place] [address]   • EYE SURG ANT [address] PROC UNLISTED Right 2/18/2015    Procedure: RIGHT LASER-ASSISTED itching. CARDIOVASCULAR:  No chest pain, chest pressure or chest discomfort  RESPIRATORY:  No shortness of breath, cough or sputum. GASTROINTESTINAL:  No anorexia, nausea, vomiting or diarrhea. No abdominal pain or blood.   GENITOURINARY:  No Burning on u -  Monitor cellulitis margin closely. -  F/u ultrasound result. -  Follow fever curve, wbc. If febrile, please obtain peripheral blood cx.   -  Reviewed labs, micro, imaging reports, available old records.   -  D/w patient, wife and primary    Thank yo

## 2021-12-18 NOTE — ED PROVIDER NOTES
In my  Patient Seen in: [facility] Emergency Department      History   Patient presents with:  Cellulitis  Arm or Hand Injury    Stated Complaint: left hand swelling since thursday    Subjective:   HPI    Patient is a an 27-year-old male who states a POSTERIOR CHAMBER LENS IMPLANTATION;  Surgeon:  Karlie Pressley MD;  Location: [address] [place] [address]   • HERNIA SURGERY     • OTHER SURGICAL HISTORY  2001    XRT x9wks for CaP   • OTHER SURGICAL HISTORY  1/10/17    Cysto-   • REMV CATARACT EXTRAC upper extremity elbow range of motion nontender. Wrist good radial pulse. Patient has erythema warmth swelling from distal forearm to the wrist and dorsal aspect of the hand over the MCPs. No definite fluctuance. No drainage or bleeding.   Patient able no fracture         MDM      Patient declined pain medicines. Patient did have a white count of 16.7. Patient does have redness warmth left hand wrist extending up the forearm. Patient was given a dose of Ancef.   Concerned about cellulitis at this point

## 2021-12-18 NOTE — ED INITIAL ASSESSMENT (HPI)
PT PRESENTS TO ED WITH LEFT HAND SWELLING SINCE LAST WEEK, PT STATES HE WAS SEEN AT URGENT CARE AND ALSO HAS FOLLOWED UP WITH PRIMARY MD, WAS SENT BY PRIMARY

## 2021-12-19 ENCOUNTER — APPOINTMENT (OUTPATIENT)
Dept: ULTRASOUND IMAGING | Facility: HOSPITAL | Age: 86
DRG: 872 | End: 2021-12-19
Attending: HOSPITALIST
Payer: MEDICARE

## 2021-12-19 LAB
ANION GAP SERPL CALC-SCNC: 9 MMOL/L (ref 0–18)
BASOPHILS # BLD: 0 X10(3) UL (ref 0–0.2)
BASOPHILS NFR BLD: 0 %
BUN BLD-MCNC: 27 MG/DL (ref 7–18)
CALCIUM BLD-MCNC: 8.4 MG/DL (ref 8.5–10.1)
CHLORIDE SERPL-SCNC: 110 MMOL/L (ref 98–112)
CO2 SERPL-SCNC: 22 MMOL/L (ref 21–32)
CREAT BLD-MCNC: 1.14 MG/DL
EOSINOPHIL # BLD: 0.18 X10(3) UL (ref 0–0.7)
EOSINOPHIL NFR BLD: 1 %
ERYTHROCYTE [DISTWIDTH] IN BLOOD BY AUTOMATED COUNT: 14.7 %
GLUCOSE BLD-MCNC: 90 MG/DL (ref 70–99)
HCT VFR BLD AUTO: 33.3 %
HGB BLD-MCNC: 10.7 G/DL
LYMPHOCYTES NFR BLD: 0.71 X10(3) UL (ref 1–4)
LYMPHOCYTES NFR BLD: 4 %
MCH RBC QN AUTO: 29.7 PG (ref 26–34)
MCHC RBC AUTO-ENTMCNC: 32.1 G/DL (ref 31–37)
MCV RBC AUTO: 92.5 FL
METAMYELOCYTES # BLD: 1.06 X10(3) UL
METAMYELOCYTES NFR BLD: 6 %
MONOCYTES # BLD: 2.12 X10(3) UL (ref 0.1–1)
MONOCYTES NFR BLD: 12 %
MYELOCYTES # BLD: 0.71 X10(3) UL
MYELOCYTES NFR BLD: 4 %
NEUTROPHILS # BLD AUTO: 10.55 X10 (3) UL (ref 1.5–7.7)
NEUTROPHILS NFR BLD: 65 %
NEUTS BAND NFR BLD: 8 %
NEUTS HYPERSEG # BLD: 12.92 X10(3) UL (ref 1.5–7.7)
NRBC BLD MANUAL-RTO: 1 %
OSMOLALITY SERPL CALC.SUM OF ELEC: 297 MOSM/KG (ref 275–295)
PLATELET # BLD AUTO: 322 10(3)UL (ref 150–450)
PLATELET MORPHOLOGY: NORMAL
POTASSIUM SERPL-SCNC: 4.5 MMOL/L (ref 3.5–5.1)
POTASSIUM SERPL-SCNC: 4.5 MMOL/L (ref 3.5–5.1)
RBC # BLD AUTO: 3.6 X10(6)UL
SODIUM SERPL-SCNC: 141 MMOL/L (ref 136–145)
TOTAL CELLS COUNTED BLD: 100
WBC # BLD AUTO: 17.7 X10(3) UL (ref 4–11)

## 2021-12-19 PROCEDURE — 84132 ASSAY OF SERUM POTASSIUM: CPT | Performed by: HOSPITALIST

## 2021-12-19 PROCEDURE — 80048 BASIC METABOLIC PNL TOTAL CA: CPT | Performed by: HOSPITALIST

## 2021-12-19 PROCEDURE — 93971 EXTREMITY STUDY: CPT | Performed by: HOSPITALIST

## 2021-12-19 PROCEDURE — 85007 BL SMEAR W/DIFF WBC COUNT: CPT | Performed by: HOSPITALIST

## 2021-12-19 PROCEDURE — 85027 COMPLETE CBC AUTOMATED: CPT | Performed by: HOSPITALIST

## 2021-12-19 PROCEDURE — 85025 COMPLETE CBC W/AUTO DIFF WBC: CPT | Performed by: HOSPITALIST

## 2021-12-19 RX ORDER — VANCOMYCIN HYDROCHLORIDE
25 ONCE
Status: COMPLETED | OUTPATIENT
Start: 2021-12-19 | End: 2021-12-19

## 2021-12-19 NOTE — PROGRESS NOTES
INFECTIOUS DISEASE PROGRESS NOTE    Jose Salazar Patient Status:  Inpatient    3/23/1934 MRN [id number]   [facility] 3NE-A Attending Avelina Ordonez MD   Hosp Day # 1 PCP Gary Bach MD     Subjective:   ROS done.  Continues to have L ([facility]     Brain atrophy ([facility]     Normal pressure hydrocephalus (HCC)     Urine retention     Polyneuropathy in diseases classified elsewhere (HCC)     AK (actinic keratosis)     Willan's itch     Left wrist effusion     Anemia     Leukocytosis     Azotemi

## 2021-12-19 NOTE — PROGRESS NOTES
Assumed PT at [address]. Alert and orientated times 4. RA. Left hand  Cellulitis. Left hand swollen, shiny, red and warm. Asccucheck. Ancef q12. Morphine for pain. IVF running. Up with walker.

## 2021-12-19 NOTE — PROGRESS NOTES
NURSING ADMISSION NOTE      Patient admitted via Cart  Oriented to room. Safety precautions initiated. Bed in low position. Call light in reach. VSS afebrile. Patient admitted from ER per cart. Denies nausea or emesis. C/O pain to left hand.

## 2021-12-19 NOTE — H&P
[facility] Hospitalist H&P       CC: Patient presents with:  Cellulitis  Arm or Hand Injury       PCP: Elpidio Aranda MD    History of Present Illness:    Patient is an 80-year-old male with significant past medical history of A. fib on anticoagulation, BPH, hyper COLONOSCOPY  4/9510    complicated by perforated diverticulum   • EYE SURG ANT SGMT PROC UNLISTED Left 2/4/2015    Procedure: LEFT LASER-ASSISTED CATARACT SURGERY WITH PHACOEMULSIFICATION WITH POSTERIOR CHAMBER LENS IMPLANTATION;  Surgeon:  Keyshawn Echevarria, 3  simvastatin 40 MG Oral Tab, Take 1 tablet (40 mg total) by mouth nightly., Disp: 90 tablet, Rfl: 3  melatonin 3 MG Oral Tab, , Disp: 30 tablet, Rfl: 0  aspirin 81 MG Oral Tab EC, Take 81 mg by mouth daily. , Disp: , Rfl:   acetaminophen 500 MG Oral Tab, 140 lb (63.5 kg)      Wt Readings from Last 6 Encounters:  12/18/21 : 138 lb (62.6 kg)  12/14/21 : 140 lb (63.5 kg)  12/13/21 : 140 lb (63.5 kg)  12/11/21 : 140 lb (63.5 kg)  10/14/21 : 135 lb (61.2 kg)  09/13/21 : 141 lb (64 kg)    Gen: No acute distress, degenerative remodeling. No destructive lesion or periosteal thickening is seen. No articular or periarticular erosive changes. No focal soft tissue abnormality. Vascular calcifications are noted. IMPRESSION: No acute osseous abnormality.   Please see d extremity ultrasound to rule out any DVT  -IV antibiotics-Ancef per ID  -Blood cultures, procalcitonin, lactic acid  -Continue to monitor for progressive swelling  -Less concern for septic arthritis at this time-we will hold off Ortho consulted discussed t

## 2021-12-19 NOTE — CONSULTS
[facility] Pharmacy Note:  Renal Adjustment for cefazolin (ANCEF)    Messi Cerna is a 80year old patient who has been prescribed cefazolin (ANCEF) 1 g every 8 hrs. The estimated creatinine clearance is 38.2 mL/min (based on SCr of 1.14 mg/dL).  The dose has

## 2021-12-19 NOTE — CONSULTS
[address] [place] [address]    Initial Pharmacokinetic Consult for Vancomycin AUC Dosing    Nehal Street is a 80year old patient who is being treated for cellulitis. Pharmacy has been asked to dose vancomycin by Dr. Joey Parks, of ID service.     Weights:

## 2021-12-19 NOTE — PROGRESS NOTES
[facility]     Hospitalist Progress Note     Carlso Rahman Patient Status:  Inpatient    3/23/1934 MRN [id number]   [facility] 3NE-A Attending Malik Christensen MD   Hosp Day # 1 PCP Roz Weber MD     Chief Complaint: elft wrist swe Pro-Calcitonin  Recent Labs   Lab 12/18/21 1934   PCT 0.31*       Cardiac  No results for input(s): TROP, PBNP in the last 168 hours. Creatinine Kinase  No results for input(s): CK in the last 168 hours.     Inflammatory Markers  Recent Labs   Lab Documentation:     Quality:  · DVT Prophylaxis: On Eliquis  · CODE status: Full code                **Certification      PHYSICIAN Certification of Need for Inpatient Hospitalization - Initial Certification    Patient will require inpatient services that w

## 2021-12-20 ENCOUNTER — ANESTHESIA EVENT (OUTPATIENT)
Dept: SURGERY | Facility: HOSPITAL | Age: 86
DRG: 872 | End: 2021-12-20
Payer: MEDICARE

## 2021-12-20 ENCOUNTER — APPOINTMENT (OUTPATIENT)
Dept: ULTRASOUND IMAGING | Facility: HOSPITAL | Age: 86
DRG: 872 | End: 2021-12-20
Attending: ORTHOPAEDIC SURGERY
Payer: MEDICARE

## 2021-12-20 ENCOUNTER — ANESTHESIA (OUTPATIENT)
Dept: SURGERY | Facility: HOSPITAL | Age: 86
DRG: 872 | End: 2021-12-20
Payer: MEDICARE

## 2021-12-20 LAB
ALBUMIN SERPL-MCNC: 2.2 G/DL (ref 3.4–5)
ALBUMIN/GLOB SERPL: 0.7 {RATIO} (ref 1–2)
ALP LIVER SERPL-CCNC: 98 U/L
ALT SERPL-CCNC: 15 U/L
ANION GAP SERPL CALC-SCNC: 9 MMOL/L (ref 0–18)
AST SERPL-CCNC: 26 U/L (ref 15–37)
BASOPHILS # BLD: 0 X10(3) UL (ref 0–0.2)
BASOPHILS NFR BLD: 0 %
BILIRUB SERPL-MCNC: 0.5 MG/DL (ref 0.1–2)
BUN BLD-MCNC: 23 MG/DL (ref 7–18)
CALCIUM BLD-MCNC: 8.3 MG/DL (ref 8.5–10.1)
CHLORIDE SERPL-SCNC: 107 MMOL/L (ref 98–112)
CO2 SERPL-SCNC: 23 MMOL/L (ref 21–32)
CREAT BLD-MCNC: 1.06 MG/DL
CREAT BLD-MCNC: 1.07 MG/DL
EOSINOPHIL # BLD: 0 X10(3) UL (ref 0–0.7)
EOSINOPHIL NFR BLD: 0 %
ERYTHROCYTE [DISTWIDTH] IN BLOOD BY AUTOMATED COUNT: 15 %
GLOBULIN PLAS-MCNC: 3.1 G/DL (ref 2.8–4.4)
GLUCOSE BLD-MCNC: 89 MG/DL (ref 70–99)
HCT VFR BLD AUTO: 33.7 %
HGB BLD-MCNC: 11.1 G/DL
LYMPHOCYTES NFR BLD: 14 %
LYMPHOCYTES NFR BLD: 2.16 X10(3) UL (ref 1–4)
MCH RBC QN AUTO: 30 PG (ref 26–34)
MCHC RBC AUTO-ENTMCNC: 32.9 G/DL (ref 31–37)
MCV RBC AUTO: 91.1 FL
METAMYELOCYTES # BLD: 0.62 X10(3) UL
METAMYELOCYTES NFR BLD: 4 %
MONOCYTES # BLD: 3.23 X10(3) UL (ref 0.1–1)
MONOCYTES NFR BLD: 21 %
MORPHOLOGY: NORMAL
MYELOCYTES # BLD: 0.62 X10(3) UL
MYELOCYTES NFR BLD: 4 %
NEUTROPHILS # BLD AUTO: 8.8 X10 (3) UL (ref 1.5–7.7)
NEUTROPHILS NFR BLD: 54 %
NEUTS BAND NFR BLD: 3 %
NEUTS HYPERSEG # BLD: 8.78 X10(3) UL (ref 1.5–7.7)
OSMOLALITY SERPL CALC.SUM OF ELEC: 291 MOSM/KG (ref 275–295)
PLATELET # BLD AUTO: 342 10(3)UL (ref 150–450)
PLATELET MORPHOLOGY: NORMAL
POTASSIUM SERPL-SCNC: 4.1 MMOL/L (ref 3.5–5.1)
PROT SERPL-MCNC: 5.3 G/DL (ref 6.4–8.2)
RBC # BLD AUTO: 3.7 X10(6)UL
SODIUM SERPL-SCNC: 139 MMOL/L (ref 136–145)
TOTAL CELLS COUNTED BLD: 100
WBC # BLD AUTO: 15.4 X10(3) UL (ref 4–11)

## 2021-12-20 PROCEDURE — 87186 SC STD MICRODIL/AGAR DIL: CPT | Performed by: ORTHOPAEDIC SURGERY

## 2021-12-20 PROCEDURE — 87147 CULTURE TYPE IMMUNOLOGIC: CPT | Performed by: ORTHOPAEDIC SURGERY

## 2021-12-20 PROCEDURE — 85007 BL SMEAR W/DIFF WBC COUNT: CPT | Performed by: HOSPITALIST

## 2021-12-20 PROCEDURE — 87070 CULTURE OTHR SPECIMN AEROBIC: CPT | Performed by: ORTHOPAEDIC SURGERY

## 2021-12-20 PROCEDURE — 85025 COMPLETE CBC W/AUTO DIFF WBC: CPT | Performed by: HOSPITALIST

## 2021-12-20 PROCEDURE — 87075 CULTR BACTERIA EXCEPT BLOOD: CPT | Performed by: ORTHOPAEDIC SURGERY

## 2021-12-20 PROCEDURE — 0J9K0ZZ DRAINAGE OF LEFT HAND SUBCUTANEOUS TISSUE AND FASCIA, OPEN APPROACH: ICD-10-PCS | Performed by: ORTHOPAEDIC SURGERY

## 2021-12-20 PROCEDURE — 85027 COMPLETE CBC AUTOMATED: CPT | Performed by: HOSPITALIST

## 2021-12-20 PROCEDURE — 87205 SMEAR GRAM STAIN: CPT | Performed by: ORTHOPAEDIC SURGERY

## 2021-12-20 PROCEDURE — 76882 US LMTD JT/FCL EVL NVASC XTR: CPT | Performed by: ORTHOPAEDIC SURGERY

## 2021-12-20 PROCEDURE — 80053 COMPREHEN METABOLIC PANEL: CPT | Performed by: HOSPITALIST

## 2021-12-20 PROCEDURE — 82565 ASSAY OF CREATININE: CPT | Performed by: STUDENT IN AN ORGANIZED HEALTH CARE EDUCATION/TRAINING PROGRAM

## 2021-12-20 RX ORDER — HYDROMORPHONE HYDROCHLORIDE 1 MG/ML
0.4 INJECTION, SOLUTION INTRAMUSCULAR; INTRAVENOUS; SUBCUTANEOUS EVERY 5 MIN PRN
Status: DISCONTINUED | OUTPATIENT
Start: 2021-12-20 | End: 2021-12-20 | Stop reason: HOSPADM

## 2021-12-20 RX ORDER — CEFAZOLIN SODIUM 1 G/3ML
INJECTION, POWDER, FOR SOLUTION INTRAMUSCULAR; INTRAVENOUS AS NEEDED
Status: DISCONTINUED | OUTPATIENT
Start: 2021-12-20 | End: 2021-12-20 | Stop reason: SURG

## 2021-12-20 RX ORDER — METOCLOPRAMIDE HYDROCHLORIDE 5 MG/ML
10 INJECTION INTRAMUSCULAR; INTRAVENOUS AS NEEDED
Status: DISCONTINUED | OUTPATIENT
Start: 2021-12-20 | End: 2021-12-20 | Stop reason: HOSPADM

## 2021-12-20 RX ORDER — ONDANSETRON 2 MG/ML
INJECTION INTRAMUSCULAR; INTRAVENOUS AS NEEDED
Status: DISCONTINUED | OUTPATIENT
Start: 2021-12-20 | End: 2021-12-20 | Stop reason: SURG

## 2021-12-20 RX ORDER — SODIUM CHLORIDE, SODIUM LACTATE, POTASSIUM CHLORIDE, CALCIUM CHLORIDE 600; 310; 30; 20 MG/100ML; MG/100ML; MG/100ML; MG/100ML
INJECTION, SOLUTION INTRAVENOUS CONTINUOUS
Status: DISCONTINUED | OUTPATIENT
Start: 2021-12-20 | End: 2021-12-20 | Stop reason: HOSPADM

## 2021-12-20 RX ORDER — MEPERIDINE HYDROCHLORIDE 25 MG/ML
12.5 INJECTION INTRAMUSCULAR; INTRAVENOUS; SUBCUTANEOUS AS NEEDED
Status: DISCONTINUED | OUTPATIENT
Start: 2021-12-20 | End: 2021-12-20 | Stop reason: HOSPADM

## 2021-12-20 RX ORDER — MIDAZOLAM HYDROCHLORIDE 1 MG/ML
1 INJECTION INTRAMUSCULAR; INTRAVENOUS EVERY 5 MIN PRN
Status: DISCONTINUED | OUTPATIENT
Start: 2021-12-20 | End: 2021-12-20 | Stop reason: HOSPADM

## 2021-12-20 RX ORDER — NALOXONE HYDROCHLORIDE 0.4 MG/ML
80 INJECTION, SOLUTION INTRAMUSCULAR; INTRAVENOUS; SUBCUTANEOUS AS NEEDED
Status: DISCONTINUED | OUTPATIENT
Start: 2021-12-20 | End: 2021-12-20 | Stop reason: HOSPADM

## 2021-12-20 RX ORDER — HYDROMORPHONE HYDROCHLORIDE 1 MG/ML
INJECTION, SOLUTION INTRAMUSCULAR; INTRAVENOUS; SUBCUTANEOUS
Status: COMPLETED
Start: 2021-12-20 | End: 2021-12-20

## 2021-12-20 RX ORDER — HYDROCODONE BITARTRATE AND ACETAMINOPHEN 5; 325 MG/1; MG/1
1 TABLET ORAL AS NEEDED
Status: DISCONTINUED | OUTPATIENT
Start: 2021-12-20 | End: 2021-12-20 | Stop reason: HOSPADM

## 2021-12-20 RX ORDER — DIPHENHYDRAMINE HYDROCHLORIDE 50 MG/ML
12.5 INJECTION INTRAMUSCULAR; INTRAVENOUS AS NEEDED
Status: DISCONTINUED | OUTPATIENT
Start: 2021-12-20 | End: 2021-12-20 | Stop reason: HOSPADM

## 2021-12-20 RX ORDER — HYDROCODONE BITARTRATE AND ACETAMINOPHEN 5; 325 MG/1; MG/1
2 TABLET ORAL AS NEEDED
Status: DISCONTINUED | OUTPATIENT
Start: 2021-12-20 | End: 2021-12-20 | Stop reason: HOSPADM

## 2021-12-20 RX ORDER — ONDANSETRON 2 MG/ML
4 INJECTION INTRAMUSCULAR; INTRAVENOUS AS NEEDED
Status: DISCONTINUED | OUTPATIENT
Start: 2021-12-20 | End: 2021-12-20 | Stop reason: HOSPADM

## 2021-12-20 RX ORDER — DEXAMETHASONE SODIUM PHOSPHATE 4 MG/ML
VIAL (ML) INJECTION AS NEEDED
Status: DISCONTINUED | OUTPATIENT
Start: 2021-12-20 | End: 2021-12-20 | Stop reason: SURG

## 2021-12-20 RX ORDER — LIDOCAINE HYDROCHLORIDE 10 MG/ML
INJECTION, SOLUTION EPIDURAL; INFILTRATION; INTRACAUDAL; PERINEURAL AS NEEDED
Status: DISCONTINUED | OUTPATIENT
Start: 2021-12-20 | End: 2021-12-20 | Stop reason: SURG

## 2021-12-20 RX ADMIN — CEFAZOLIN SODIUM 2 G: 1 INJECTION, POWDER, FOR SOLUTION INTRAMUSCULAR; INTRAVENOUS at 19:12:00

## 2021-12-20 RX ADMIN — SODIUM CHLORIDE: 9 INJECTION, SOLUTION INTRAVENOUS at 19:39:00

## 2021-12-20 RX ADMIN — DEXAMETHASONE SODIUM PHOSPHATE 4 MG: 4 MG/ML VIAL (ML) INJECTION at 19:12:00

## 2021-12-20 RX ADMIN — ONDANSETRON 4 MG: 2 INJECTION INTRAMUSCULAR; INTRAVENOUS at 19:12:00

## 2021-12-20 RX ADMIN — LIDOCAINE HYDROCHLORIDE 50 MG: 10 INJECTION, SOLUTION EPIDURAL; INFILTRATION; INTRACAUDAL; PERINEURAL at 19:02:00

## 2021-12-20 NOTE — PAYOR COMM NOTE
--------------  ADMISSION REVIEW     Zina Farrell MA O  Subscriber #:  [id number]  Authorization Number: [id number]    Admit date: 12/18/21  Admit time:  4:10 PM       REVIEW DOCUMENTATION:     ED Provider Notes      ED Provider Notes signed by Leigh Gee Pulse 75   Resp 16   Temp 97.5 °F (36.4 °C)   Temp src Oral   SpO2 94 %   O2 Device None (Room air)       Current:/70   Pulse 75   Temp 97.5 °F (36.4 °C) (Oral)   Resp 16   Ht 162.6 cm (5' 4\")   Wt 62.6 kg   SpO2 94%   BMI 23.69 kg/m²         Phys Normal     Left hand x-ray as outpatient no fracture        Disposition and Plan     Clinical Impression:  Cellulitis of hand, left  (primary encounter diagnosis)     Disposition:  Admit  12/18/2021  2:49 pm      Signed by Judie Montemayor MD on 12/18/2021  3: °C) (Oral)   Resp 16   Ht 5' 4\" (1.626 m)   Wt 138 lb (62.6 kg)   SpO2 94%   BMI 23.69 kg/m²       Gen: No acute distress, alert and oriented x3  Pulm: Lungs clear bilaterally, good inspiratory effort no wheezing rales or rhonchi  CV:  nL S1/S2 regular ra soft tissue abnormality. Vascular calcifications are noted. IMPRESSION: No acute osseous abnormality. Please see details above.      XR WRIST NAVICULAR COMPLETE (4 VIEWS), LEFT (CPT=73110)    Result Date: 12/11/2021  DATE OF SERVICE: 12.11.2021  XR [place] for progressive swelling  -Less concern for septic arthritis at this time-we will hold off Ortho consulted discussed this with ID and ID also does not feel this looks like septic arthritis  -check ESR, CRP,    #A.  Fib  -Rate controlled and doing well  -Con 12/19/2021 2113 Given 2 mg Intravenous Maylene Kocher, RN    12/19/2021 1357 Given 2 mg Intravenous Dylan Ochoa RN    12/19/2021 1135 Given 2 mg Intravenous Dylan Ochoa RN      0.9% NaCl infusion     Date Action Dose Route User    12/20/2021 0

## 2021-12-20 NOTE — PLAN OF CARE
Assumed pt care at 0730  A&Ox4, forgetful at times  VSS  Left hand swelling  IV abx continued  Added IV Vanco  US negative for DVT, soft tissue US pending  Eliquis on hold for possible procedure by ortho sx  POC discussed with pt and family bedside  Needs

## 2021-12-20 NOTE — CONSULTS
[facility]  Report of Consultation    Donice Drivers Patient Status:  Inpatient    3/23/1934 MRN [id number]   [facility] 3NE-A Attending Nicole Carroll MD   Hosp Day # 2 PCP Leobardo Ramos MD     Reason for Consultation:  Left hand in • OTHER SURGICAL HISTORY  1/10/17    Cysto-   • REMV CATARACT EXTRACAP,INSERT LENS Left 2/4/2015    Procedure: LEFT LASER-ASSISTED CATARACT SURGERY WITH PHACOEMULSIFICATION WITH POSTERIOR CHAMBER LENS IMPLANTATION;  Surgeon:  Anu Navarro MD; Intravenous, Q2H PRN  •  polyethylene glycol (PEG 3350) (MIRALAX) powder packet 17 g, 17 g, Oral, Daily PRN  •  sennosides (SENOKOT) tab 17.2 mg, 17.2 mg, Oral, Nightly PRN  •  bisacodyl (DULCOLAX) rectal suppository 10 mg, 10 mg, Rectal, Daily PRN  •  ond ([address]     Emphysema lung ([address]     Other cardiomyopathy ([address]     Paroxysmal atrial fibrillation (HCC)     Vitamin D deficiency     Hypercoagulable state, secondary ([address]     Aortic ectasia ([address]     Brain atrophy ([address]     Normal pressure hydrocephalus (H

## 2021-12-20 NOTE — PLAN OF CARE
80year old alert and oriented x4. Has swollen painful left hand infected with cellulitis. Tele: normal sinus with frequent SVPBs. : 90's on room air. Receiving ANCEF IV antibiotic  for cellulitis.  Reports 6 or 7 out of 10 pain in left hand and receive

## 2021-12-20 NOTE — PROGRESS NOTES
[facility]     Hospitalist Progress Note     Shelly Seats Patient Status:  Inpatient    3/23/1934 MRN [id number]   [facility] 3NE-A Attending Izzy Rodriguez MD   Hosp Day # 2 PCP Herbert Sol MD     Chief Complaint: elft wrist swe TP 6.3*  --   --   --  5.3*    < > = values in this interval not displayed. Estimated Creatinine Clearance: 40.7 mL/min (based on SCr of 1.07 mg/dL). No results for input(s): PTP, INR in the last 168 hours.          COVID-19 Lab Results    COVID- elevated-stable leukocytosis  -Discussed with ID, Ortho to take patient for I&D of the abscess today     #A.  Fib  -Rate controlled and doing well  -Currently normal sinus rhythm  -Holding Eliquis for above     #BPH continue tamsulosin     #Hyperlipidemia

## 2021-12-20 NOTE — PLAN OF CARE
Assumed care of patient @60. No acute distress noted. A&Ox4. VSS on RA. NSR w/ occasional PVCs on tele. C/o pain in the am,7/10 received prn morphine 2mg per MAR. NPO for I&D this evening. Pt updated on plan. Safety precautions in place.  Staff will sarahi

## 2021-12-20 NOTE — PROGRESS NOTES
[facility]    Report of Consultation    Margaux Plaza Patient Status:  Inpatient    3/23/1934 MRN [id number]   [facility] 3NE-A Attending Chela Hurd MD   Hosp Day # 1 PCP Renata Bingham MD     Date of Admission:  2021  Da perforated diverticulum   • EYE SURG ANT SGMT PROC UNLISTED Left 2/4/2015    Procedure: LEFT LASER-ASSISTED CATARACT SURGERY WITH PHACOEMULSIFICATION WITH POSTERIOR CHAMBER LENS IMPLANTATION;  Surgeon:  Richmond Duverney, MD;  Location: [address] [place] [address] Q24H  [Held by provider] apixaban (ELIQUIS) tab 2.5 mg, 2.5 mg, Oral, BID  [Held by provider] aspirin EC tab 81 mg, 81 mg, Oral, Daily  bicalutamide (CASODEX) tab 50 mg, 50 mg, Oral, Daily  metoprolol tartrate (LOPRESSOR) tab 25 mg, 25 mg, Oral, 2x Daily(B mg by mouth every 6 (six) hours as needed for Pain. Calcium Carbonate-Vitamin D (CALCIUM 500 + D OR), Take 1 tablet by mouth daily.           Allergies    Amoxicillin             RASH      Physical Exam:   Blood pressure 129/45, pulse 71, temperature 98.3 Lorne Londono MD on 12/19/2021 at 6:07 PM            Impression:     Cellulitis of hand, left    Recommendations:  - Continue broad spectrum antibiotics for dorsal hand swelling  - Please obtain US of the soft tissue of the L dorsal hand to better evaluate for inv

## 2021-12-21 LAB
ANION GAP SERPL CALC-SCNC: 9 MMOL/L (ref 0–18)
BASOPHILS # BLD: 0 X10(3) UL (ref 0–0.2)
BASOPHILS NFR BLD: 0 %
BUN BLD-MCNC: 29 MG/DL (ref 7–18)
CALCIUM BLD-MCNC: 8.3 MG/DL (ref 8.5–10.1)
CHLORIDE SERPL-SCNC: 109 MMOL/L (ref 98–112)
CO2 SERPL-SCNC: 23 MMOL/L (ref 21–32)
CREAT BLD-MCNC: 1.24 MG/DL
EOSINOPHIL # BLD: 0 X10(3) UL (ref 0–0.7)
EOSINOPHIL NFR BLD: 0 %
ERYTHROCYTE [DISTWIDTH] IN BLOOD BY AUTOMATED COUNT: 14.8 %
GLUCOSE BLD-MCNC: 135 MG/DL (ref 70–99)
HCT VFR BLD AUTO: 34.2 %
HGB BLD-MCNC: 10.5 G/DL
LYMPHOCYTES NFR BLD: 1.3 X10(3) UL (ref 1–4)
LYMPHOCYTES NFR BLD: 9 %
MCH RBC QN AUTO: 29.2 PG (ref 26–34)
MCHC RBC AUTO-ENTMCNC: 30.7 G/DL (ref 31–37)
MCV RBC AUTO: 95.3 FL
METAMYELOCYTES # BLD: 0.86 X10(3) UL
METAMYELOCYTES NFR BLD: 6 %
MONOCYTES # BLD: 0.72 X10(3) UL (ref 0.1–1)
MONOCYTES NFR BLD: 5 %
MYELOCYTES # BLD: 1.01 X10(3) UL
MYELOCYTES NFR BLD: 7 %
NEUTROPHILS # BLD AUTO: 10.4 X10 (3) UL (ref 1.5–7.7)
NEUTROPHILS NFR BLD: 68 %
NEUTS BAND NFR BLD: 5 %
NEUTS HYPERSEG # BLD: 10.51 X10(3) UL (ref 1.5–7.7)
OSMOLALITY SERPL CALC.SUM OF ELEC: 300 MOSM/KG (ref 275–295)
PLATELET # BLD AUTO: 362 10(3)UL (ref 150–450)
PLATELET MORPHOLOGY: NORMAL
POTASSIUM SERPL-SCNC: 4.4 MMOL/L (ref 3.5–5.1)
RBC # BLD AUTO: 3.59 X10(6)UL
SODIUM SERPL-SCNC: 141 MMOL/L (ref 136–145)
TOTAL CELLS COUNTED BLD: 100
WBC # BLD AUTO: 14.4 X10(3) UL (ref 4–11)

## 2021-12-21 PROCEDURE — 85025 COMPLETE CBC W/AUTO DIFF WBC: CPT | Performed by: ORTHOPAEDIC SURGERY

## 2021-12-21 PROCEDURE — B54MZZA ULTRASONOGRAPHY OF RIGHT UPPER EXTREMITY VEINS, GUIDANCE: ICD-10-PCS | Performed by: HOSPITALIST

## 2021-12-21 PROCEDURE — 85007 BL SMEAR W/DIFF WBC COUNT: CPT | Performed by: ORTHOPAEDIC SURGERY

## 2021-12-21 PROCEDURE — 85027 COMPLETE CBC AUTOMATED: CPT | Performed by: ORTHOPAEDIC SURGERY

## 2021-12-21 PROCEDURE — 80048 BASIC METABOLIC PNL TOTAL CA: CPT | Performed by: ORTHOPAEDIC SURGERY

## 2021-12-21 PROCEDURE — 76937 US GUIDE VASCULAR ACCESS: CPT

## 2021-12-21 PROCEDURE — 05HB33Z INSERTION OF INFUSION DEVICE INTO RIGHT BASILIC VEIN, PERCUTANEOUS APPROACH: ICD-10-PCS | Performed by: HOSPITALIST

## 2021-12-21 PROCEDURE — 36410 VNPNXR 3YR/> PHY/QHP DX/THER: CPT

## 2021-12-21 NOTE — OPERATIVE REPORT
[facility]    PATIENT'S NAME: Rylee Rodriguez   ATTENDING PHYSICIAN: Jo Tracy MD   OPERATING PHYSICIAN: [place]ia Suazo M.D.    PATIENT ACCOUNT#:   [de-identified]    LOCATION:  [address] [place] [address]  MEDICAL RECORD #:   [id number]       DATE OF BIRTH:  [birth year]

## 2021-12-21 NOTE — ANESTHESIA PROCEDURE NOTES
Airway  Date/Time: 12/20/2021 7:04 PM  Urgency: elective      General Information and Staff    Patient location during procedure: OR  Anesthesiologist: Carrington Last MD  Performed: anesthesiologist     Indications and Patient Condition  Indicatio

## 2021-12-21 NOTE — ANESTHESIA PREPROCEDURE EVALUATION
PRE-OP EVALUATION    Patient Name: Rickie Sands    Admit Diagnosis: Cellulitis of hand, left [L03.114]    Pre-op Diagnosis: Infection [B99.9]    IRRIGATION AND DEBRIDEMENT LEFT HAND    Anesthesia Procedure: IRRIGATION AND DEBRIDEMENT LEFT HAND (Left ) Intravenous, Q6H PRN  metoclopramide (REGLAN) injection 5 mg, 5 mg, Intravenous, Q8H PRN  melatonin tab 3 mg, 3 mg, Oral, Nightly PRN  atorvastatin (LIPITOR) tab 20 mg, 20 mg, Oral, Nightly  [COMPLETED] potassium chloride (K-DUR M20) CR tab 40 mEq, 40 mEq, GI/Hepatic/Renal             (+) chronic renal disease and CRI                   Cardiovascular                  (+) hypertension   (+) hyperlipidemia               (+) dysrhythmias and atrial fibrillation                  Endo/Other                     (+ ago    Alcohol use: Yes      Comment: 3-4x a week 1-2 drinks      Drug use: No     Available pre-op labs reviewed.   Lab Results   Component Value Date    WBC 15.4 (H) 12/20/2021    RBC 3.70 (L) 12/20/2021    HGB 11.1 (L) 12/20/2021    HCT 33.7 (L) 12/20/20

## 2021-12-21 NOTE — PROGRESS NOTES
Postop day #1 incision and drainage left hand      He notes that the pressure pain is much improved    /64 (BP Location: Right arm)   Pulse 90   Temp 97.8 °F (36.6 °C) (Oral)   Resp 18   Ht 5' 4\" (1.626 m)   Wt 138 lb (62.6 kg)   SpO2 96%   BMI 23. 6

## 2021-12-21 NOTE — PLAN OF CARE
Received patient back from surgery. AOx4. Had local anesthesia for the I &D of the left hand. Left hand swollen, elevated on multiple blankets. Dressing to left hand dry and intact. Morphine IV given for pain 7/10 with good result. IV fluids infusing.  On 3 physical limitations  - Instruct pt to call for assistance with activity based on assessment  - Modify environment to reduce risk of injury  - Provide assistive devices as appropriate  - Consider OT/PT consult to assist with strengthening/mobility  - Encou

## 2021-12-21 NOTE — ANESTHESIA POSTPROCEDURE EVALUATION
[facility] & [address] Patient Status:  Inpatient   Age/Gender 80year old male MRN [id number]   Location [address] [facility] Attending Anais Edwards MD   Hosp Day # 2 PCP Sophy Ortiz MD       Anesthesia Post-op Note

## 2021-12-21 NOTE — PROGRESS NOTES
[facility]                INFECTIOUS DISEASE PROGRESS NOTE    Hugo Marin Patient Status:  Inpatient    3/23/1934 MRN [id number]   [facility] 3NE-A Attending Thelma Palma MD   Hosp Day # 3 PCP Dilcia Charlton MD     Antibioti --   --  89 135*   BUN 36*   < > 27*  --   --  23* 29*   CREATSERUM 1.47*   < > 1.14   < > 1.06 1.07 1.24   GFRAA 49*   < > 66   < > 73 72 60   GFRNAA 42*   < > 58*   < > 63 62 52*   CA 9.2   < > 8.4*  --   --  8.3* 8.3*   ALB 2.4*  --   --   --   --  2.2* [facility])     Urine retention     Polyneuropathy in diseases classified elsewhere ([facility]     AK (actinic keratosis)     Willan's itch     Left wrist effusion     Anemia     Leukocytosis     Azotemia     Hyperglycemia     Cellulitis of hand, left            ASSE

## 2021-12-21 NOTE — PAYOR COMM NOTE
--------------  CONTINUED STAY REVIEW    Katherin Parker MA [facility]  Subscriber #:  [id number]  Authorization Number: [id number]    Admit date: 12/18/21  Admit time:  4:10 PM    Admitting Physician: Caity Pulliam MD  Attending Physician:  Jenna Hackett MD  Pr Route User    12/20/2021 1917 Given 50 mcg Intravenous Jorge Ramirez MD    12/20/2021 1902 Given 50 mcg Intravenous Jorge Ramirez MD      fentaNYL citrate (SUBLIMAZE) 0.05 MG/ML injection 25 mcg     Date Action Dose Route User    12/20/ Given 0.4 mg Oral Carlton Andrews RN    12/20/2021 0966 Given 0.4 mg Oral Pankaj Huynh RN      vancomycin (VANCOCIN) 1,000 mg in sodium chloride 0.9% 250 mL IVPB-ADDV     Date Action Dose Route User    12/20/2021 1654 New Bag 1,000 mg Intravenous Malachi

## 2021-12-21 NOTE — PROGRESS NOTES
[facility]     Hospitalist Progress Note     Jayme Peneolpe Patient Status:  Inpatient    3/23/1934 MRN [id number]   [facility] 3NE-A Attending Kamilah Lacey MD   Hosp Day # 3 PCP Rhonda Bacon MD     Chief Complaint: elft wrist swe 5.3*  --     < > = values in this interval not displayed. Estimated Creatinine Clearance: 35.1 mL/min (based on SCr of 1.24 mg/dL). No results for input(s): PTP, INR in the last 168 hours.          COVID-19 Lab Results    COVID-19  Lab Results   C controlled and doing well  -Currently normal sinus rhythm  -resume eliquis when ok with surgery     #BPH continue tamsulosin     #Hyperlipidemia  -Statin        #CKD  -Stable creatinine  -Improved      Plan of care: post op I and D and doing wel   Plan of

## 2021-12-21 NOTE — ANESTHESIA PROCEDURE NOTES
Peripheral IV  Date/Time: 12/20/2021 6:55 PM  Inserted by: Paresh Winters MD    Placement  Needle size: 20 G  Laterality: right  Location: hand  Local anesthetic: none  Site prep: alcohol  Technique: anatomical landmarks  Attempts: 1

## 2021-12-21 NOTE — BRIEF OP NOTE
Pre-Operative Diagnosis: Infection [B99.9]     Post-Operative Diagnosis: Infection [B99.9]      Procedure Performed:   IRRIGATION AND DEBRIDEMENT LEFT HAND    Surgeon(s) and Role:     Nathaly Linton MD - Primary    Assistant(s):        Surgical Finding

## 2021-12-21 NOTE — PLAN OF CARE
Patient A&O x 4. Afib on tele, eliquis on hold. PT had I&D, Ortho came this AM and changed dressing and monitored wound. Blood cultures pending. IVF infusing. X1 with walker. Left hand pain. All needs met at this time, staff will continue to monitor.

## 2021-12-22 LAB
ANION GAP SERPL CALC-SCNC: 3 MMOL/L (ref 0–18)
BASOPHILS # BLD: 0 X10(3) UL (ref 0–0.2)
BASOPHILS NFR BLD: 0 %
BUN BLD-MCNC: 26 MG/DL (ref 7–18)
CALCIUM BLD-MCNC: 8.1 MG/DL (ref 8.5–10.1)
CHLORIDE SERPL-SCNC: 112 MMOL/L (ref 98–112)
CO2 SERPL-SCNC: 25 MMOL/L (ref 21–32)
CREAT BLD-MCNC: 1.07 MG/DL
EOSINOPHIL # BLD: 0 X10(3) UL (ref 0–0.7)
EOSINOPHIL NFR BLD: 0 %
ERYTHROCYTE [DISTWIDTH] IN BLOOD BY AUTOMATED COUNT: 14.9 %
GLUCOSE BLD-MCNC: 105 MG/DL (ref 70–99)
HCT VFR BLD AUTO: 29.5 %
HGB BLD-MCNC: 9.4 G/DL
LYMPHOCYTES NFR BLD: 1.56 X10(3) UL (ref 1–4)
LYMPHOCYTES NFR BLD: 10 %
MCH RBC QN AUTO: 30.1 PG (ref 26–34)
MCHC RBC AUTO-ENTMCNC: 31.9 G/DL (ref 31–37)
MCV RBC AUTO: 94.6 FL
METAMYELOCYTES # BLD: 0.31 X10(3) UL
METAMYELOCYTES NFR BLD: 2 %
MONOCYTES # BLD: 0.47 X10(3) UL (ref 0.1–1)
MONOCYTES NFR BLD: 3 %
MYELOCYTES # BLD: 0.47 X10(3) UL
MYELOCYTES NFR BLD: 3 %
NEUTROPHILS # BLD AUTO: 11.23 X10 (3) UL (ref 1.5–7.7)
NEUTROPHILS NFR BLD: 75 %
NEUTS BAND NFR BLD: 7 %
NEUTS HYPERSEG # BLD: 12.79 X10(3) UL (ref 1.5–7.7)
OSMOLALITY SERPL CALC.SUM OF ELEC: 295 MOSM/KG (ref 275–295)
PLATELET # BLD AUTO: 365 10(3)UL (ref 150–450)
PLATELET MORPHOLOGY: NORMAL
POTASSIUM SERPL-SCNC: 3.7 MMOL/L (ref 3.5–5.1)
RBC # BLD AUTO: 3.12 X10(6)UL
SODIUM SERPL-SCNC: 140 MMOL/L (ref 136–145)
TOTAL CELLS COUNTED BLD: 100
WBC # BLD AUTO: 15.6 X10(3) UL (ref 4–11)

## 2021-12-22 PROCEDURE — 85007 BL SMEAR W/DIFF WBC COUNT: CPT | Performed by: ORTHOPAEDIC SURGERY

## 2021-12-22 PROCEDURE — 85025 COMPLETE CBC W/AUTO DIFF WBC: CPT | Performed by: ORTHOPAEDIC SURGERY

## 2021-12-22 PROCEDURE — 80048 BASIC METABOLIC PNL TOTAL CA: CPT | Performed by: ORTHOPAEDIC SURGERY

## 2021-12-22 PROCEDURE — 85027 COMPLETE CBC AUTOMATED: CPT | Performed by: ORTHOPAEDIC SURGERY

## 2021-12-22 RX ORDER — POTASSIUM CHLORIDE 1.5 G/1.77G
40 POWDER, FOR SOLUTION ORAL ONCE
Status: COMPLETED | OUTPATIENT
Start: 2021-12-22 | End: 2021-12-22

## 2021-12-22 NOTE — PLAN OF CARE
A&Ox4. On room air, lungs clear and diminished. Abdomen soft and nontender, BS active. Denies N/V/D. A-fib on tele, eliquis on hold. Patient is s/p I&D of left hand- dressing clean, dry, and intact. CMS present.  C/o pain- PRN Morphine given with positive r and behaviors that affect risk of falls.   - Sherman fall precautions as indicated by assessment.  - Educate pt/family on patient safety including physical limitations  - Instruct pt to call for assistance with activity based on assessment  - Modify envir

## 2021-12-22 NOTE — PLAN OF CARE
Patient A&O x 4. Room air. Afib on tele, eliquis and asa restarted this AM. Awaiting final rec for Abx for discharge, patient has midline in Right Upper arm. Right Limb precautions in place. Needs setup for IV infusions outpatient, case management aware.  A

## 2021-12-22 NOTE — CM/SW NOTE
Order received to setup outpatient abx. Referral to [facility] sent via aidin. Reviewed options for infusion/IV abx: 1) nursing home, 2) home with home health care 3) home with in office teach and train vs 4) outpt infusion at MD office or infusion center.   D

## 2021-12-22 NOTE — PROGRESS NOTES
[facility]     Hospitalist Progress Note     Jose Salazar Patient Status:  Inpatient    3/23/1934 MRN [id number]   [facility] 3NE-A Attending Avelina Ordonez MD   Hosp Day # 4 PCP Gary Bach MD     Chief Complaint: elft wrist swe Estimated Creatinine Clearance: 40.7 mL/min (based on SCr of 1.07 mg/dL). No results for input(s): PTP, INR in the last 168 hours.          COVID-19 Lab Results    COVID-19  Lab Results   Component Value Date    COVID19 Not Detected 12/18/2021    C tamsulosin     #Hyperlipidemia  -Statin        #CKD  -Stable creatinine  -Improved      Plan of care: post op I and D and doing wel -plan for discharge for IV antibiotics and home health for dressing changes patient voiced concern about being able to take

## 2021-12-22 NOTE — OPERATIVE REPORT
[facility]    PATIENT'S NAME: Tor Sanford   ATTENDING PHYSICIAN: Valdemar Turner MD   OPERATING PHYSICIAN: Jaleesa Davidson M.D.    PATIENT ACCOUNT#:   [de-identified]    LOCATION:  [address] [place] [address]  MEDICAL RECORD #:   [id number]       DATE OF BIRTH:  [birth year]

## 2021-12-22 NOTE — PROGRESS NOTES
[facility]                INFECTIOUS DISEASE PROGRESS NOTE    Marci Lynne Patient Status:  Inpatient    3/23/1934 MRN [id number]   [facility] 3NE-A Attending Trevor Daugherty MD   Hosp Day # 4 PCP Acosta Rucker MD     Antibioti .0   < > 365.0   NEUT 65   < > 75   LYMPH 4   < > 10   MON 12   < > 3   EOS 1   < > 0   NRBC 1*  --   --     < > = values in this interval not displayed.          Recent Labs   Lab 12/18/21  1305 12/19/21  0706 12/20/21  1124 12/21/21  0711 12/22/2 History of malignant melanoma     Senile purpura (HCC)     COPD (chronic obstructive pulmonary disease) (HCC)     Aortic atherosclerosis (HCC)     Emphysema lung (HCC)     Other cardiomyopathy (HCC)     Paroxysmal atrial fibrillation (HCC)     Vitamin D de

## 2021-12-22 NOTE — PROGRESS NOTES
Comfortable    /52 (BP Location: Right arm)   Pulse 59   Temp 97.8 °F (36.6 °C) (Oral)   Resp 18   Ht 5' 4\" (1.626 m)   Wt 138 lb (62.6 kg)   SpO2 92%   BMI 23.69 kg/m²       Swelling and redness much improved. Small amount of pus expressed .     Jayleen Piper

## 2021-12-23 LAB
ANION GAP SERPL CALC-SCNC: 2 MMOL/L (ref 0–18)
BASOPHILS # BLD: 0 X10(3) UL (ref 0–0.2)
BASOPHILS NFR BLD: 0 %
BUN BLD-MCNC: 19 MG/DL (ref 7–18)
CALCIUM BLD-MCNC: 8 MG/DL (ref 8.5–10.1)
CHLORIDE SERPL-SCNC: 115 MMOL/L (ref 98–112)
CO2 SERPL-SCNC: 25 MMOL/L (ref 21–32)
CREAT BLD-MCNC: 1 MG/DL
EOSINOPHIL # BLD: 0 X10(3) UL (ref 0–0.7)
EOSINOPHIL NFR BLD: 0 %
ERYTHROCYTE [DISTWIDTH] IN BLOOD BY AUTOMATED COUNT: 15 %
GLUCOSE BLD-MCNC: 94 MG/DL (ref 70–99)
HCT VFR BLD AUTO: 29.4 %
HGB BLD-MCNC: 9.2 G/DL
LYMPHOCYTES NFR BLD: 1.97 X10(3) UL (ref 1–4)
LYMPHOCYTES NFR BLD: 14 %
MCH RBC QN AUTO: 29.9 PG (ref 26–34)
MCHC RBC AUTO-ENTMCNC: 31.3 G/DL (ref 31–37)
MCV RBC AUTO: 95.5 FL
METAMYELOCYTES # BLD: 0.28 X10(3) UL
METAMYELOCYTES NFR BLD: 2 %
MONOCYTES # BLD: 1.27 X10(3) UL (ref 0.1–1)
MONOCYTES NFR BLD: 9 %
MORPHOLOGY: NORMAL
MYELOCYTES # BLD: 0.14 X10(3) UL
MYELOCYTES NFR BLD: 1 %
NEUTROPHILS # BLD AUTO: 9.11 X10 (3) UL (ref 1.5–7.7)
NEUTROPHILS NFR BLD: 67 %
NEUTS BAND NFR BLD: 7 %
NEUTS HYPERSEG # BLD: 10.43 X10(3) UL (ref 1.5–7.7)
OSMOLALITY SERPL CALC.SUM OF ELEC: 296 MOSM/KG (ref 275–295)
PLATELET # BLD AUTO: 369 10(3)UL (ref 150–450)
PLATELET MORPHOLOGY: NORMAL
POTASSIUM SERPL-SCNC: 3.9 MMOL/L (ref 3.5–5.1)
POTASSIUM SERPL-SCNC: 3.9 MMOL/L (ref 3.5–5.1)
RBC # BLD AUTO: 3.08 X10(6)UL
SODIUM SERPL-SCNC: 142 MMOL/L (ref 136–145)
TOTAL CELLS COUNTED BLD: 100
WBC # BLD AUTO: 14.1 X10(3) UL (ref 4–11)

## 2021-12-23 PROCEDURE — 80048 BASIC METABOLIC PNL TOTAL CA: CPT | Performed by: ORTHOPAEDIC SURGERY

## 2021-12-23 PROCEDURE — 85025 COMPLETE CBC W/AUTO DIFF WBC: CPT | Performed by: ORTHOPAEDIC SURGERY

## 2021-12-23 PROCEDURE — 85027 COMPLETE CBC AUTOMATED: CPT | Performed by: ORTHOPAEDIC SURGERY

## 2021-12-23 PROCEDURE — 85007 BL SMEAR W/DIFF WBC COUNT: CPT | Performed by: ORTHOPAEDIC SURGERY

## 2021-12-23 PROCEDURE — 84132 ASSAY OF SERUM POTASSIUM: CPT | Performed by: HOSPITALIST

## 2021-12-23 RX ORDER — POLYETHYLENE GLYCOL 3350 17 G/17G
17 POWDER, FOR SOLUTION ORAL DAILY PRN
Qty: 30 PACKET | Refills: 0 | Status: SHIPPED | OUTPATIENT
Start: 2021-12-23

## 2021-12-23 RX ORDER — HYDROCODONE BITARTRATE AND ACETAMINOPHEN 5; 325 MG/1; MG/1
1-2 TABLET ORAL EVERY 4 HOURS PRN
Qty: 14 TABLET | Refills: 0 | Status: SHIPPED | OUTPATIENT
Start: 2021-12-23

## 2021-12-23 RX ORDER — ERTAPENEM 1 G/1
1 INJECTION, POWDER, LYOPHILIZED, FOR SOLUTION INTRAMUSCULAR; INTRAVENOUS DAILY
Qty: 14 EACH | Refills: 0 | Status: SHIPPED | OUTPATIENT
Start: 2021-12-23 | End: 2021-12-23

## 2021-12-23 NOTE — PROGRESS NOTES
[facility]                INFECTIOUS DISEASE PROGRESS NOTE    Donice Drivers Patient Status:  Inpatient    3/23/1934 MRN [id number]   [facility] 3NE-A Attending Nicole Carroll MD   Hosp Day # 5 PCP Leobardo Ramos MD     Antibioti --     < > = values in this interval not displayed.          Recent Labs   Lab 12/18/21  1305 12/19/21  0706 12/20/21  1124 12/20/21  1124 12/21/21  0711 12/22/21  0530 12/23/21  0802   *   < > 89   < > 135* 105* 94   BUN 36*   < > 23*   < > 29* 26* Hyperlipidemia     ED (erectile dysfunction)     PVD (posterior vitreous detachment), bilateral     Retinal pigment epithelial mottling of macula     Abnormal Heart Score CT     Stricture of male urethra, unspecified stricture type     Other neutropenia (H

## 2021-12-23 NOTE — PLAN OF CARE
Pt A&Ox4 Room Air  Resting in bed  A-fib on Tele, pt on Eliquis  Meds given per Mar  C/O of Left pain Medicated per Mar  Right arm precaution   Midline in place  IV Vanco  IVF Rich@[employer]  Safety precaution maintained  Will continue to monitor      Problem: Pa risk of injury  - Provide assistive devices as appropriate  - Consider OT/PT consult to assist with strengthening/mobility  - Encourage toileting schedule  Outcome: Progressing     Problem: Integumentary status not within defined limits  Goal: Pt's integum

## 2021-12-23 NOTE — PROGRESS NOTES
Adrianna Jailyn is followed for the incision and drainage of the left dorsal hand.   He denies pain      /56 (BP Location: Left leg)   Pulse 64   Temp 98.4 °F (36.9 °C) (Oral)   Resp 18   Ht 5' 4\" (1.626 m)   Wt 138 lb (62.6 kg)   SpO2 92%   BMI 23.69 kg/m²

## 2021-12-23 NOTE — CM/SW NOTE
CM spoke to SILVIA CHAWLA regarding discharge plan, patient requesting IV antibiotic infusion done at [facility]. Plan for IV Invanz 1g daily for 14 days for discharge.   [facility]Y spoke to Schuyler in [facility] at [id number] to

## 2021-12-23 NOTE — PLAN OF CARE
Assumed pt care at 0730. A&Ox4. VSS. Room air. A-fib on tele. R hand PIV infusing 0.9NS @ 100 mL/hr. R midline SL, unit draw for labs. IV Vanco Q24H. Denies N/V. Reports L hand pain improved with PRN morphine. Cardiac/carb controlled diet.  Voiding, urinal pt to call for assistance with activity based on assessment  - Modify environment to reduce risk of injury  - Provide assistive devices as appropriate  - Consider OT/PT consult to assist with strengthening/mobility  - Encourage toileting schedule  Outcome:

## 2021-12-23 NOTE — CM/SW NOTE
Spoke w/pt and pt's son. They are now agreeable to go home w/HH and IV ABX from [facility]. Attempted to set the pt up [facility] at [facility] but SW was informed it was closed and there was no pharmacist available today or tomorrow.      Referral sent to [facility]

## 2021-12-23 NOTE — DISCHARGE SUMMARY
General Medicine Discharge Summary     Patient ID:  Anila Leong  80year old  3/23/1934    Admit date: 12/18/2021    Discharge date and time: 12/23/2021    Attending Physician: Yaron Biggs MD     Primary Care Physician: Eda Martinez MD     Reason thrombophlebitis  -Pain control with morphine as needed  -Left upper extremity ultrasound noted for complex vascular mass in the soft tissue along the distal aspect of the left hand-abscess versus hematoma  -s/p I and D with purulence noted, abscess cx horacio daily.    simvastatin 40 MG Oral Tab  Take 1 tablet (40 mg total) by mouth nightly. melatonin 3 MG Oral Tab      aspirin 81 MG Oral Tab EC  Take 81 mg by mouth daily.     acetaminophen 500 MG Oral Tab  Take 500 mg by mouth every 6 (six) hours as needed f

## 2021-12-24 VITALS
SYSTOLIC BLOOD PRESSURE: 132 MMHG | HEIGHT: 64 IN | WEIGHT: 138 LBS | HEART RATE: 70 BPM | RESPIRATION RATE: 16 BRPM | TEMPERATURE: 99 F | OXYGEN SATURATION: 93 % | DIASTOLIC BLOOD PRESSURE: 44 MMHG | BODY MASS INDEX: 23.56 KG/M2

## 2021-12-24 LAB
BASOPHILS # BLD: 0 X10(3) UL (ref 0–0.2)
BASOPHILS NFR BLD: 0 %
CREAT BLD-MCNC: 1.02 MG/DL
EOSINOPHIL # BLD: 0 X10(3) UL (ref 0–0.7)
EOSINOPHIL NFR BLD: 0 %
ERYTHROCYTE [DISTWIDTH] IN BLOOD BY AUTOMATED COUNT: 15 %
HCT VFR BLD AUTO: 28.9 %
HGB BLD-MCNC: 9.1 G/DL
LYMPHOCYTES NFR BLD: 1.79 X10(3) UL (ref 1–4)
LYMPHOCYTES NFR BLD: 14 %
MCH RBC QN AUTO: 29.7 PG (ref 26–34)
MCHC RBC AUTO-ENTMCNC: 31.5 G/DL (ref 31–37)
MCV RBC AUTO: 94.4 FL
MONOCYTES # BLD: 1.15 X10(3) UL (ref 0.1–1)
MONOCYTES NFR BLD: 9 %
MORPHOLOGY: NORMAL
NEUTROPHILS # BLD AUTO: 8.18 X10 (3) UL (ref 1.5–7.7)
NEUTROPHILS NFR BLD: 74 %
NEUTS BAND NFR BLD: 3 %
NEUTS HYPERSEG # BLD: 9.86 X10(3) UL (ref 1.5–7.7)
PLATELET # BLD AUTO: 345 10(3)UL (ref 150–450)
PLATELET MORPHOLOGY: NORMAL
RBC # BLD AUTO: 3.06 X10(6)UL
TOTAL CELLS COUNTED BLD: 100
WBC # BLD AUTO: 12.8 X10(3) UL (ref 4–11)

## 2021-12-24 PROCEDURE — 82565 ASSAY OF CREATININE: CPT | Performed by: ORTHOPAEDIC SURGERY

## 2021-12-24 PROCEDURE — 85007 BL SMEAR W/DIFF WBC COUNT: CPT | Performed by: HOSPITALIST

## 2021-12-24 PROCEDURE — 85025 COMPLETE CBC W/AUTO DIFF WBC: CPT | Performed by: HOSPITALIST

## 2021-12-24 PROCEDURE — 85027 COMPLETE CBC AUTOMATED: CPT | Performed by: HOSPITALIST

## 2021-12-24 NOTE — PROGRESS NOTES
[facility]     Hospitalist Progress Note     Merline Mane Patient Status:  Inpatient    3/23/1934 MRN [id number]   [facility] 3NE-A Attending Jenna Hackett MD   Hosp Day # 6 PCP Elpidio Aranda MD     Chief Complaint: elft wrist swe --   --   --    AST 23  --  26  --   --   --   --   --   --    ALT 38  --  15*  --   --   --   --   --   --    BILT 0.4  --  0.5  --   --   --   --   --   --    TP 6.3*  --  5.3*  --   --   --   --   --   --     < > = values in this interval not displayed discharge   -Lizette Vega to be discharged  -=abx changed to ancef -arrange for abx at dc      #A.  Fib  -Rate controlled and doing well  -Currently normal sinus rhythm  -Eliquis resumed     #BPH continue tamsulosin     #Hyperlipidemia  -Statin        #CKD  -Stabl

## 2021-12-24 NOTE — DISCHARGE SUMMARY
General Medicine Discharge Summary     Patient ID:  Shelly Ballesteros  80year old  3/23/1934    Admit date: 2021    Discharge date and time: 2021    Attending Physician: Izzy Rodriguez MD     Primary Care Physician: Herbert Sol MD     Reason thrombophlebitis  -Pain control with morphine as needed  -Left upper extremity ultrasound noted for complex vascular mass in the soft tissue along the distal aspect of the left hand-abscess versus hematoma  -s/p I and D with purulence noted, abscess cx horacio total) by mouth daily. simvastatin 40 MG Oral Tab  Take 1 tablet (40 mg total) by mouth nightly. melatonin 3 MG Oral Tab      aspirin 81 MG Oral Tab EC  Take 81 mg by mouth daily.     acetaminophen 500 MG Oral Tab  Take 500 mg by mouth every 6 (six) h

## 2021-12-24 NOTE — CM/SW NOTE
[facility] now stating they are not able to provide the IV ABX because they did not get the order in time and because  could not secure an accepting [facility] agency. Sent out several referrals for [facility]. Also sent several referrals to IV ABX companies.  Awaiting respo

## 2021-12-24 NOTE — PLAN OF CARE
Assumed care of patient @ 0730. No acute distress noted. C/o moderate pain to left hand. VSS on RA. Due medications given. Updated on plan and patient verbalized understanding. Safety precautions in place. Staff will continue to monitor.    Problem: Patient Instruct pt to call for assistance with activity based on assessment  - Modify environment to reduce risk of injury  - Provide assistive devices as appropriate  - Consider OT/PT consult to assist with strengthening/mobility  - Encourage toileting schedule

## 2021-12-24 NOTE — CM/SW NOTE
SW reached out to [facility] and spoke w/Naa. They confirmed they are able to deliver the medication at 2pm today. They also confirmed they have the final order. Ancef Q12. Reserved provider in [address] [place] [address] [place]. Cost will be 3.52 per day.  [facility] stated they

## 2021-12-24 NOTE — PLAN OF CARE
Pt is A&O x4. VSS- Afib on tele. SpO2 stable on RA. Lung sounds clear. IVF- 0.9NS @ 100mL/hr infusing to right hand PIV. Right arm precautions d/t Right upper midline- SL. Unit collect. Dressing to left hand is CD&I. IV ancef given as ordered.  CHANTE caba safety including physical limitations  - Instruct pt to call for assistance with activity based on assessment  - Modify environment to reduce risk of injury  - Provide assistive devices as appropriate  - Consider OT/PT consult to assist with strengthening/

## 2021-12-24 NOTE — PROGRESS NOTES
NURSING DISCHARGE NOTE    Discharged Home via Wheelchair. Accompanied by Family member and Support staff  Belongings Taken by patient/family. Pt discharged home with [facility].  IV abt delivered to patient's house at 2pm. Pt given instruct

## 2021-12-27 NOTE — PAYOR COMM NOTE
Discharge Notification    Patient Name: Jayesh Graham MA [facility]  Subscriber #: [id number]  Authorization Number: [id number]  Admit Date/Time: 12/18/2021 12:10 PM  Discharge Date/Time: 12/24/2021 2:30 PM

## 2022-02-01 PROBLEM — M86.9: Status: ACTIVE | Noted: 2022-02-01

## 2022-02-24 PROBLEM — L02.414: Status: ACTIVE | Noted: 2022-02-24

## 2022-02-24 PROBLEM — R22.32 LOCALIZED SWELLING ON LEFT HAND: Status: ACTIVE | Noted: 2022-02-24

## 2022-04-05 PROBLEM — D64.9 ANEMIA: Status: RESOLVED | Noted: 2021-12-18 | Resolved: 2022-04-05

## 2022-04-05 PROBLEM — R22.32 LOCALIZED SWELLING ON LEFT HAND: Status: RESOLVED | Noted: 2022-02-24 | Resolved: 2022-04-05

## 2022-04-05 PROBLEM — L02.414: Status: RESOLVED | Noted: 2022-02-24 | Resolved: 2022-04-05

## 2022-04-05 PROBLEM — R79.89 AZOTEMIA: Status: RESOLVED | Noted: 2021-12-18 | Resolved: 2022-04-05

## 2022-04-05 PROBLEM — J30.9 ALLERGIC RHINITIS, UNSPECIFIED SEASONALITY, UNSPECIFIED TRIGGER: Status: ACTIVE | Noted: 2022-04-05

## 2022-04-05 PROBLEM — M25.432 LEFT WRIST EFFUSION: Status: RESOLVED | Noted: 2021-12-13 | Resolved: 2022-04-05

## 2022-04-05 PROBLEM — D70.8 OTHER NEUTROPENIA (HCC): Status: RESOLVED | Noted: 2018-03-02 | Resolved: 2022-04-05

## 2022-04-05 PROBLEM — G47.00 INSOMNIA, UNSPECIFIED TYPE: Status: ACTIVE | Noted: 2022-04-05

## 2022-04-05 PROBLEM — B35.6 JOCK ITCH: Status: ACTIVE | Noted: 2022-04-05

## 2022-04-05 PROBLEM — D64.9 ANEMIA, UNSPECIFIED TYPE: Status: RESOLVED | Noted: 2019-05-21 | Resolved: 2022-04-05

## 2022-04-05 PROBLEM — D72.829 LEUKOCYTOSIS: Status: RESOLVED | Noted: 2021-12-18 | Resolved: 2022-04-05

## 2022-04-05 PROBLEM — R73.9 HYPERGLYCEMIA: Status: RESOLVED | Noted: 2021-12-18 | Resolved: 2022-04-05

## 2023-10-19 ENCOUNTER — LABORATORY ENCOUNTER (OUTPATIENT)
Dept: LAB | Age: 88
End: 2023-10-19
Attending: UROLOGY
Payer: MEDICARE

## 2023-10-19 ENCOUNTER — EKG ENCOUNTER (OUTPATIENT)
Dept: LAB | Age: 88
End: 2023-10-19
Attending: UROLOGY
Payer: MEDICARE

## 2023-10-19 DIAGNOSIS — N20.1 LEFT URETERAL STONE: ICD-10-CM

## 2023-10-19 LAB
ALBUMIN SERPL-MCNC: 3.9 G/DL (ref 3.4–5)
ALBUMIN/GLOB SERPL: 1.3 {RATIO} (ref 1–2)
ALP LIVER SERPL-CCNC: 67 U/L
ALT SERPL-CCNC: 23 U/L
ANION GAP SERPL CALC-SCNC: 6 MMOL/L (ref 0–18)
AST SERPL-CCNC: 20 U/L (ref 15–37)
BASOPHILS # BLD AUTO: 0.01 X10(3) UL (ref 0–0.2)
BASOPHILS NFR BLD AUTO: 0.2 %
BILIRUB SERPL-MCNC: 0.5 MG/DL (ref 0.1–2)
BUN BLD-MCNC: 31 MG/DL (ref 7–18)
CALCIUM BLD-MCNC: 9.6 MG/DL (ref 8.5–10.1)
CHLORIDE SERPL-SCNC: 111 MMOL/L (ref 98–112)
CO2 SERPL-SCNC: 23 MMOL/L (ref 21–32)
CREAT BLD-MCNC: 2.31 MG/DL
EGFRCR SERPLBLD CKD-EPI 2021: 26 ML/MIN/1.73M2 (ref 60–?)
EOSINOPHIL # BLD AUTO: 0.03 X10(3) UL (ref 0–0.7)
EOSINOPHIL NFR BLD AUTO: 0.7 %
ERYTHROCYTE [DISTWIDTH] IN BLOOD BY AUTOMATED COUNT: 13.5 %
GLOBULIN PLAS-MCNC: 3 G/DL (ref 2.8–4.4)
GLUCOSE BLD-MCNC: 122 MG/DL (ref 70–99)
HCT VFR BLD AUTO: 35.5 %
HGB BLD-MCNC: 11.3 G/DL
IMM GRANULOCYTES # BLD AUTO: 0.05 X10(3) UL (ref 0–1)
IMM GRANULOCYTES NFR BLD: 1.2 %
LYMPHOCYTES # BLD AUTO: 1.74 X10(3) UL (ref 1–4)
LYMPHOCYTES NFR BLD AUTO: 40.1 %
MCH RBC QN AUTO: 31.7 PG (ref 26–34)
MCHC RBC AUTO-ENTMCNC: 31.8 G/DL (ref 31–37)
MCV RBC AUTO: 99.7 FL
MONOCYTES # BLD AUTO: 0.64 X10(3) UL (ref 0.1–1)
MONOCYTES NFR BLD AUTO: 14.7 %
NEUTROPHILS # BLD AUTO: 1.87 X10 (3) UL (ref 1.5–7.7)
NEUTROPHILS # BLD AUTO: 1.87 X10(3) UL (ref 1.5–7.7)
NEUTROPHILS NFR BLD AUTO: 43.1 %
OSMOLALITY SERPL CALC.SUM OF ELEC: 298 MOSM/KG (ref 275–295)
PLATELET # BLD AUTO: 164 10(3)UL (ref 150–450)
POTASSIUM SERPL-SCNC: 4.7 MMOL/L (ref 3.5–5.1)
PROT SERPL-MCNC: 6.9 G/DL (ref 6.4–8.2)
RBC # BLD AUTO: 3.56 X10(6)UL
SODIUM SERPL-SCNC: 140 MMOL/L (ref 136–145)
WBC # BLD AUTO: 4.3 X10(3) UL (ref 4–11)

## 2023-10-19 PROCEDURE — 80053 COMPREHEN METABOLIC PANEL: CPT

## 2023-10-19 PROCEDURE — 93005 ELECTROCARDIOGRAM TRACING: CPT

## 2023-10-19 PROCEDURE — 85025 COMPLETE CBC W/AUTO DIFF WBC: CPT

## 2023-10-19 PROCEDURE — 93010 ELECTROCARDIOGRAM REPORT: CPT | Performed by: INTERNAL MEDICINE

## 2023-10-19 PROCEDURE — 36415 COLL VENOUS BLD VENIPUNCTURE: CPT

## 2023-10-20 LAB
ATRIAL RATE: 65 BPM
Q-T INTERVAL: 462 MS
QRS DURATION: 120 MS
QTC CALCULATION (BEZET): 480 MS
R AXIS: 74 DEGREES
T AXIS: 32 DEGREES
VENTRICULAR RATE: 65 BPM

## 2023-10-26 ENCOUNTER — ANESTHESIA EVENT (OUTPATIENT)
Dept: SURGERY | Facility: HOSPITAL | Age: 88
End: 2023-10-26
Payer: MEDICARE

## 2023-10-26 NOTE — PAT NURSING NOTE
Abnormal EKG/history of cardiomyopathy reviewed by /anesthesia. He requests note of cardiac clearance pre op. Faxed request to and spoke debbie Arzate/Dr.David Jaky Hammer office. She will give message to MD. Also faxed request to surgeon as [phone] [address] [place].

## 2023-11-01 ENCOUNTER — HOSPITAL ENCOUNTER (OUTPATIENT)
Facility: HOSPITAL | Age: 88
Setting detail: HOSPITAL OUTPATIENT SURGERY
Discharge: HOME | End: 2023-11-01
Attending: UROLOGY | Admitting: UROLOGY
Payer: MEDICARE

## 2023-11-01 ENCOUNTER — APPOINTMENT (OUTPATIENT)
Dept: GENERAL RADIOLOGY | Facility: HOSPITAL | Age: 88
End: 2023-11-01
Attending: UROLOGY
Payer: MEDICARE

## 2023-11-01 ENCOUNTER — ANESTHESIA (OUTPATIENT)
Dept: SURGERY | Facility: HOSPITAL | Age: 88
End: 2023-11-01
Payer: MEDICARE

## 2023-11-01 VITALS
DIASTOLIC BLOOD PRESSURE: 67 MMHG | SYSTOLIC BLOOD PRESSURE: 113 MMHG | TEMPERATURE: 97 F | HEIGHT: 64 IN | BODY MASS INDEX: 21.53 KG/M2 | OXYGEN SATURATION: 90 % | RESPIRATION RATE: 19 BRPM | HEART RATE: 94 BPM | WEIGHT: 126.13 LBS

## 2023-11-01 DIAGNOSIS — N20.1 LEFT URETERAL STONE: Primary | ICD-10-CM

## 2023-11-01 PROCEDURE — 82365 CALCULUS SPECTROSCOPY: CPT | Performed by: UROLOGY

## 2023-11-01 PROCEDURE — 88300 SURGICAL PATH GROSS: CPT | Performed by: UROLOGY

## 2023-11-01 PROCEDURE — 0T778DZ DILATION OF LEFT URETER WITH INTRALUMINAL DEVICE, VIA NATURAL OR ARTIFICIAL OPENING ENDOSCOPIC: ICD-10-PCS | Performed by: UROLOGY

## 2023-11-01 PROCEDURE — 0TC78ZZ EXTIRPATION OF MATTER FROM LEFT URETER, VIA NATURAL OR ARTIFICIAL OPENING ENDOSCOPIC: ICD-10-PCS | Performed by: UROLOGY

## 2023-11-01 DEVICE — URETERAL STENT
Type: IMPLANTABLE DEVICE | Site: URETER | Status: FUNCTIONAL
Brand: PERCUFLEX™ PLUS

## 2023-11-01 RX ORDER — EPHEDRINE SULFATE 50 MG/ML
INJECTION INTRAVENOUS AS NEEDED
Status: DISCONTINUED | OUTPATIENT
Start: 2023-11-01 | End: 2023-11-01 | Stop reason: SURG

## 2023-11-01 RX ORDER — HYDROMORPHONE HYDROCHLORIDE 1 MG/ML
0.2 INJECTION, SOLUTION INTRAMUSCULAR; INTRAVENOUS; SUBCUTANEOUS EVERY 5 MIN PRN
Status: DISCONTINUED | OUTPATIENT
Start: 2023-11-01 | End: 2023-11-01

## 2023-11-01 RX ORDER — HYDROMORPHONE HYDROCHLORIDE 1 MG/ML
0.6 INJECTION, SOLUTION INTRAMUSCULAR; INTRAVENOUS; SUBCUTANEOUS EVERY 5 MIN PRN
Status: DISCONTINUED | OUTPATIENT
Start: 2023-11-01 | End: 2023-11-01

## 2023-11-01 RX ORDER — PHENYLEPHRINE HCL 10 MG/ML
VIAL (ML) INJECTION AS NEEDED
Status: DISCONTINUED | OUTPATIENT
Start: 2023-11-01 | End: 2023-11-01 | Stop reason: SURG

## 2023-11-01 RX ORDER — ACETAMINOPHEN AND CODEINE PHOSPHATE 300; 30 MG/1; MG/1
1 TABLET ORAL ONCE AS NEEDED
Status: DISCONTINUED | OUTPATIENT
Start: 2023-11-01 | End: 2023-11-01

## 2023-11-01 RX ORDER — LIDOCAINE HYDROCHLORIDE 10 MG/ML
INJECTION, SOLUTION EPIDURAL; INFILTRATION; INTRACAUDAL; PERINEURAL AS NEEDED
Status: DISCONTINUED | OUTPATIENT
Start: 2023-11-01 | End: 2023-11-01 | Stop reason: SURG

## 2023-11-01 RX ORDER — DEXAMETHASONE SODIUM PHOSPHATE 4 MG/ML
VIAL (ML) INJECTION AS NEEDED
Status: DISCONTINUED | OUTPATIENT
Start: 2023-11-01 | End: 2023-11-01 | Stop reason: SURG

## 2023-11-01 RX ORDER — ONDANSETRON 2 MG/ML
4 INJECTION INTRAMUSCULAR; INTRAVENOUS EVERY 6 HOURS PRN
Status: DISCONTINUED | OUTPATIENT
Start: 2023-11-01 | End: 2023-11-01

## 2023-11-01 RX ORDER — ACETAMINOPHEN AND CODEINE PHOSPHATE 300; 30 MG/1; MG/1
2 TABLET ORAL ONCE AS NEEDED
Status: DISCONTINUED | OUTPATIENT
Start: 2023-11-01 | End: 2023-11-01

## 2023-11-01 RX ORDER — HYDROMORPHONE HYDROCHLORIDE 1 MG/ML
0.4 INJECTION, SOLUTION INTRAMUSCULAR; INTRAVENOUS; SUBCUTANEOUS EVERY 5 MIN PRN
Status: DISCONTINUED | OUTPATIENT
Start: 2023-11-01 | End: 2023-11-01

## 2023-11-01 RX ORDER — CEPHALEXIN 500 MG/1
500 CAPSULE ORAL 3 TIMES DAILY
Status: DISCONTINUED | OUTPATIENT
Start: 2023-11-01 | End: 2023-11-01

## 2023-11-01 RX ORDER — SODIUM CHLORIDE, SODIUM LACTATE, POTASSIUM CHLORIDE, CALCIUM CHLORIDE 600; 310; 30; 20 MG/100ML; MG/100ML; MG/100ML; MG/100ML
INJECTION, SOLUTION INTRAVENOUS CONTINUOUS
Status: DISCONTINUED | OUTPATIENT
Start: 2023-11-01 | End: 2023-11-01

## 2023-11-01 RX ORDER — ACETAMINOPHEN 500 MG
1000 TABLET ORAL ONCE
Status: DISCONTINUED | OUTPATIENT
Start: 2023-11-01 | End: 2023-11-01 | Stop reason: HOSPADM

## 2023-11-01 RX ORDER — ACETAMINOPHEN 500 MG
1000 TABLET ORAL ONCE AS NEEDED
Status: DISCONTINUED | OUTPATIENT
Start: 2023-11-01 | End: 2023-11-01

## 2023-11-01 RX ORDER — ONDANSETRON 2 MG/ML
INJECTION INTRAMUSCULAR; INTRAVENOUS AS NEEDED
Status: DISCONTINUED | OUTPATIENT
Start: 2023-11-01 | End: 2023-11-01 | Stop reason: SURG

## 2023-11-01 RX ORDER — CEFAZOLIN SODIUM/WATER 2 G/20 ML
2 SYRINGE (ML) INTRAVENOUS ONCE
Status: COMPLETED | OUTPATIENT
Start: 2023-11-01 | End: 2023-11-01

## 2023-11-01 RX ORDER — METOCLOPRAMIDE HYDROCHLORIDE 5 MG/ML
10 INJECTION INTRAMUSCULAR; INTRAVENOUS EVERY 8 HOURS PRN
Status: DISCONTINUED | OUTPATIENT
Start: 2023-11-01 | End: 2023-11-01

## 2023-11-01 RX ORDER — NALOXONE HYDROCHLORIDE 0.4 MG/ML
0.08 INJECTION, SOLUTION INTRAMUSCULAR; INTRAVENOUS; SUBCUTANEOUS AS NEEDED
Status: DISCONTINUED | OUTPATIENT
Start: 2023-11-01 | End: 2023-11-01

## 2023-11-01 RX ADMIN — LIDOCAINE HYDROCHLORIDE 50 MG: 10 INJECTION, SOLUTION EPIDURAL; INFILTRATION; INTRACAUDAL; PERINEURAL at 07:19:00

## 2023-11-01 RX ADMIN — PHENYLEPHRINE HCL 50 MCG: 10 MG/ML VIAL (ML) INJECTION at 08:33:00

## 2023-11-01 RX ADMIN — PHENYLEPHRINE HCL 50 MCG: 10 MG/ML VIAL (ML) INJECTION at 07:39:00

## 2023-11-01 RX ADMIN — PHENYLEPHRINE HCL 50 MCG: 10 MG/ML VIAL (ML) INJECTION at 07:23:00

## 2023-11-01 RX ADMIN — EPHEDRINE SULFATE 5 MG: 50 INJECTION INTRAVENOUS at 07:23:00

## 2023-11-01 RX ADMIN — DEXAMETHASONE SODIUM PHOSPHATE 4 MG: 4 MG/ML VIAL (ML) INJECTION at 07:25:00

## 2023-11-01 RX ADMIN — PHENYLEPHRINE HCL 50 MCG: 10 MG/ML VIAL (ML) INJECTION at 08:03:00

## 2023-11-01 RX ADMIN — CEFAZOLIN SODIUM/WATER 2 G: 2 G/20 ML SYRINGE (ML) INTRAVENOUS at 07:24:00

## 2023-11-01 RX ADMIN — PHENYLEPHRINE HCL 50 MCG: 10 MG/ML VIAL (ML) INJECTION at 08:22:00

## 2023-11-01 RX ADMIN — PHENYLEPHRINE HCL 50 MCG: 10 MG/ML VIAL (ML) INJECTION at 07:46:00

## 2023-11-01 RX ADMIN — ONDANSETRON 4 MG: 2 INJECTION INTRAMUSCULAR; INTRAVENOUS at 07:25:00

## 2023-11-01 RX ADMIN — SODIUM CHLORIDE, SODIUM LACTATE, POTASSIUM CHLORIDE, CALCIUM CHLORIDE: 600; 310; 30; 20 INJECTION, SOLUTION INTRAVENOUS at 07:14:00

## 2023-11-01 NOTE — ANESTHESIA PROCEDURE NOTES
Airway  Date/Time: 11/1/2023 7:21 AM  Urgency: elective    Airway not difficult    General Information and Staff    Patient location during procedure: OR  Anesthesiologist: Shanti Zee MD  Performed: anesthesiologist   Performed by: Shanti Zee MD  Authorized by: Shanti Zee MD      Indications and Patient Condition  Indications for airway management: anesthesia  Sedation level: deep  Preoxygenated: yes  Patient position: sniffing  Mask difficulty assessment: 1 - vent by mask    Final Airway Details  Final airway type: supraglottic airway      Successful airway: classic  Size 4       Number of attempts at approach: 1

## 2023-11-01 NOTE — ANESTHESIA POSTPROCEDURE EVALUATION
[facility] & [address] Patient Status:  Hospital Outpatient Surgery   Age/Gender 80year old male MRN [id number]   [facility] SURGERY Attending Crissy Duckworth MD   Hosp Day # 0 PCP Jodee García MD       Anesthesia Post-op Note    CYSTOSCOPY, LEFT URETEROSCOPY, LEFT RETOGRADE PYELOGRAM, BASKET STONE EXTRACTION, POSSIBLE URETERAL STENT PLACEMENT, HOLMIUM LASER LITHOTRIPSY; URETHRAL DIALATION    Procedure Summary       Date: 11/01/23 Room / Location: [address] [place] [address] MAIN OR    Anesthesia Start: 3145 Anesthesia Stop: 5940    Procedure: CYSTOSCOPY, LEFT URETEROSCOPY, LEFT RETOGRADE PYELOGRAM, BASKET STONE EXTRACTION, POSSIBLE URETERAL STENT PLACEMENT, HOLMIUM LASER LITHOTRIPSY; URETHRAL DIALATION (Left: Ureter) Diagnosis: (LEFT URETERAL STONE WITH HYDRONEPHROSIS)    Surgeons: Crissy Duckworth MD Anesthesiologist: Milton France MD    Anesthesia Type: general ASA Status: 3            Anesthesia Type: general    Vitals Value Taken Time   /71 11/01/23 0857   Temp 98.3 11/01/23 0857   Pulse 76 11/01/23 0857   Resp 18 11/01/23 0857   SpO2 98 11/01/23 0857       Patient Location: PACU    Anesthesia Type: general    Airway Patency: patent and extubated    Postop Pain Control: adequate    Mental Status: preanesthetic baseline    Nausea/Vomiting: none    Cardiopulmonary/Hydration status: stable euvolemic    Complications: no apparent anesthesia related complications    Postop vital signs: stable    Dental Exam: Unchanged from Preop    Patient to be discharged from PACU when criteria met.

## 2023-11-10 LAB
CAOX MONOHYDRATE: 50 %
WEIGHT-STONE: 2 MG

## 2024-04-11 NOTE — CM/SW NOTE
MSW made referral to [facility]  P:[phone]  F:[phone] per MD order. They met with the patient at bedside to explain services, provide choice, and financial disclosure.   The patient is open to [facility] but would like to discuss closer Take 600 mg of Ibuprofen 2 times a day for 7 days  Go back to water aerobics  Patient Education        Knee Arthritis: Care Instructions  Overview     Knee arthritis is a breakdown of the cartilage that cushions your knee joint. When the cartilage wears down, your bones rub against each other. This causes pain and stiffness. Knee arthritis tends to get worse with time.  Treatment for knee arthritis involves reducing pain, making the leg muscles stronger, and staying at a healthy body weight. The treatment usually does not improve the health of the cartilage, but it can reduce pain and improve how well your knee works.  You can take simple measures to protect your knee joints, ease your pain, and help you stay active.  Follow-up care is a key part of your treatment and safety. Be sure to make and go to all appointments, and call your doctor if you are having problems. It's also a good idea to know your test results and keep a list of the medicines you take.  How can you care for yourself at home?  Know that knee arthritis will cause more pain on some days than on others.  Stay at a healthy weight. Lose weight if you are overweight. When you stand up, the pressure on your knees from every pound of body weight is multiplied four times. So if you lose 10 pounds, you will reduce the pressure on your knees by 40 pounds.  Talk to your doctor or physical therapist about exercises that will help ease joint pain.  Stretch to help prevent stiffness and to prevent injury before you exercise. You may enjoy gentle forms of yoga to help keep your knee joints and muscles flexible.  Walk instead of jog.  Ride a bike. This makes your thigh muscles stronger and takes pressure off your knee.  Wear well-fitting and comfortable shoes.  Exercise in chest-deep water. This can help you exercise longer with less pain.  Avoid exercises that include squatting or kneeling. They can put a lot of strain on your knees.  Talk to your doctor to make

## 2024-11-17 ENCOUNTER — HOSPITAL ENCOUNTER (OUTPATIENT)
Facility: HOSPITAL | Age: 89
Setting detail: OBSERVATION
Discharge: HOME | End: 2024-11-18
Attending: STUDENT IN AN ORGANIZED HEALTH CARE EDUCATION/TRAINING PROGRAM
Payer: MEDICARE

## 2024-11-17 ENCOUNTER — APPOINTMENT (OUTPATIENT)
Dept: GENERAL RADIOLOGY | Facility: HOSPITAL | Age: 89
End: 2024-11-17
Payer: MEDICARE

## 2024-11-17 DIAGNOSIS — N30.90 CYSTITIS: ICD-10-CM

## 2024-11-17 DIAGNOSIS — J18.9 COMMUNITY ACQUIRED PNEUMONIA OF LEFT LOWER LOBE OF LUNG: Primary | ICD-10-CM

## 2024-11-17 LAB
ALBUMIN SERPL-MCNC: 4.5 G/DL (ref 3.2–4.8)
ALBUMIN/GLOB SERPL: 1.7 {RATIO} (ref 1–2)
ALP LIVER SERPL-CCNC: 80 U/L
ALT SERPL-CCNC: 19 U/L
ANION GAP SERPL CALC-SCNC: 11 MMOL/L (ref 0–18)
AST SERPL-CCNC: 26 U/L (ref ?–34)
BASOPHILS # BLD AUTO: 0 X10(3) UL (ref 0–0.2)
BASOPHILS NFR BLD AUTO: 0 %
BILIRUB SERPL-MCNC: 1 MG/DL (ref 0.2–0.9)
BILIRUB UR QL STRIP.AUTO: NEGATIVE
BUN BLD-MCNC: 26 MG/DL (ref 9–23)
CALCIUM BLD-MCNC: 10.1 MG/DL (ref 8.7–10.4)
CHLORIDE SERPL-SCNC: 106 MMOL/L (ref 98–112)
CO2 SERPL-SCNC: 23 MMOL/L (ref 21–32)
COLOR UR AUTO: YELLOW
CREAT BLD-MCNC: 1.79 MG/DL
EGFRCR SERPLBLD CKD-EPI 2021: 36 ML/MIN/1.73M2 (ref 60–?)
EOSINOPHIL # BLD AUTO: 0.01 X10(3) UL (ref 0–0.7)
EOSINOPHIL NFR BLD AUTO: 0.3 %
ERYTHROCYTE [DISTWIDTH] IN BLOOD BY AUTOMATED COUNT: 13.4 %
FLUAV + FLUBV RNA SPEC NAA+PROBE: NEGATIVE
FLUAV + FLUBV RNA SPEC NAA+PROBE: NEGATIVE
GLOBULIN PLAS-MCNC: 2.6 G/DL (ref 2–3.5)
GLUCOSE BLD-MCNC: 90 MG/DL (ref 70–99)
GLUCOSE UR STRIP.AUTO-MCNC: NORMAL MG/DL
HCT VFR BLD AUTO: 39.1 %
HGB BLD-MCNC: 13 G/DL
IMM GRANULOCYTES # BLD AUTO: 0.05 X10(3) UL (ref 0–1)
IMM GRANULOCYTES NFR BLD: 1.3 %
KETONES UR STRIP.AUTO-MCNC: 10 MG/DL
LACTATE SERPL-SCNC: 1.1 MMOL/L (ref 0.5–2)
LACTATE SERPL-SCNC: 2.1 MMOL/L (ref 0.5–2)
LEUKOCYTE ESTERASE UR QL STRIP.AUTO: 75
LYMPHOCYTES # BLD AUTO: 1.17 X10(3) UL (ref 1–4)
LYMPHOCYTES NFR BLD AUTO: 30.6 %
MCH RBC QN AUTO: 31.7 PG (ref 26–34)
MCHC RBC AUTO-ENTMCNC: 33.2 G/DL (ref 31–37)
MCV RBC AUTO: 95.4 FL
MONOCYTES # BLD AUTO: 0.55 X10(3) UL (ref 0.1–1)
MONOCYTES NFR BLD AUTO: 14.4 %
NEUTROPHILS # BLD AUTO: 2.04 X10 (3) UL (ref 1.5–7.7)
NEUTROPHILS # BLD AUTO: 2.04 X10(3) UL (ref 1.5–7.7)
NEUTROPHILS NFR BLD AUTO: 53.4 %
OSMOLALITY SERPL CALC.SUM OF ELEC: 294 MOSM/KG (ref 275–295)
PH UR STRIP.AUTO: 5 [PH] (ref 5–8)
PLATELET # BLD AUTO: 144 10(3)UL (ref 150–450)
POTASSIUM SERPL-SCNC: 3.6 MMOL/L (ref 3.5–5.1)
PROCALCITONIN SERPL-MCNC: 0.09 NG/ML (ref ?–0.05)
PROT SERPL-MCNC: 7.1 G/DL (ref 5.7–8.2)
PROT UR STRIP.AUTO-MCNC: 30 MG/DL
Q-T INTERVAL: 510 MS
QRS DURATION: 120 MS
QTC CALCULATION (BEZET): 496 MS
R AXIS: 64 DEGREES
RBC # BLD AUTO: 4.1 X10(6)UL
RSV RNA SPEC NAA+PROBE: NEGATIVE
SARS-COV-2 RNA RESP QL NAA+PROBE: NOT DETECTED
SODIUM SERPL-SCNC: 140 MMOL/L (ref 136–145)
SP GR UR STRIP.AUTO: 1.01 (ref 1–1.03)
T AXIS: 4 DEGREES
UROBILINOGEN UR STRIP.AUTO-MCNC: NORMAL MG/DL
VENTRICULAR RATE: 57 BPM
WBC # BLD AUTO: 3.8 X10(3) UL (ref 4–11)

## 2024-11-17 PROCEDURE — 87186 SC STD MICRODIL/AGAR DIL: CPT | Performed by: STUDENT IN AN ORGANIZED HEALTH CARE EDUCATION/TRAINING PROGRAM

## 2024-11-17 PROCEDURE — 80053 COMPREHEN METABOLIC PANEL: CPT | Performed by: STUDENT IN AN ORGANIZED HEALTH CARE EDUCATION/TRAINING PROGRAM

## 2024-11-17 PROCEDURE — 87086 URINE CULTURE/COLONY COUNT: CPT | Performed by: STUDENT IN AN ORGANIZED HEALTH CARE EDUCATION/TRAINING PROGRAM

## 2024-11-17 PROCEDURE — 83605 ASSAY OF LACTIC ACID: CPT | Performed by: STUDENT IN AN ORGANIZED HEALTH CARE EDUCATION/TRAINING PROGRAM

## 2024-11-17 PROCEDURE — 93005 ELECTROCARDIOGRAM TRACING: CPT

## 2024-11-17 PROCEDURE — 36415 COLL VENOUS BLD VENIPUNCTURE: CPT

## 2024-11-17 PROCEDURE — 85025 COMPLETE CBC W/AUTO DIFF WBC: CPT | Performed by: STUDENT IN AN ORGANIZED HEALTH CARE EDUCATION/TRAINING PROGRAM

## 2024-11-17 PROCEDURE — 84145 PROCALCITONIN (PCT): CPT

## 2024-11-17 PROCEDURE — 87040 BLOOD CULTURE FOR BACTERIA: CPT | Performed by: STUDENT IN AN ORGANIZED HEALTH CARE EDUCATION/TRAINING PROGRAM

## 2024-11-17 PROCEDURE — 87077 CULTURE AEROBIC IDENTIFY: CPT | Performed by: STUDENT IN AN ORGANIZED HEALTH CARE EDUCATION/TRAINING PROGRAM

## 2024-11-17 PROCEDURE — 71045 X-RAY EXAM CHEST 1 VIEW: CPT | Performed by: STUDENT IN AN ORGANIZED HEALTH CARE EDUCATION/TRAINING PROGRAM

## 2024-11-17 PROCEDURE — 96365 THER/PROPH/DIAG IV INF INIT: CPT

## 2024-11-17 PROCEDURE — 81001 URINALYSIS AUTO W/SCOPE: CPT | Performed by: STUDENT IN AN ORGANIZED HEALTH CARE EDUCATION/TRAINING PROGRAM

## 2024-11-17 PROCEDURE — 99285 EMERGENCY DEPT VISIT HI MDM: CPT

## 2024-11-17 PROCEDURE — 93010 ELECTROCARDIOGRAM REPORT: CPT

## 2024-11-17 PROCEDURE — 0241U SARS-COV-2/FLU A AND B/RSV BY PCR (GENEXPERT): CPT | Performed by: STUDENT IN AN ORGANIZED HEALTH CARE EDUCATION/TRAINING PROGRAM

## 2024-11-17 PROCEDURE — 96361 HYDRATE IV INFUSION ADD-ON: CPT

## 2024-11-17 RX ORDER — ASPIRIN 81 MG/1
81 TABLET ORAL DAILY
Status: DISCONTINUED | OUTPATIENT
Start: 2024-11-17 | End: 2024-11-18

## 2024-11-17 RX ORDER — SODIUM CHLORIDE 9 MG/ML
INJECTION, SOLUTION INTRAVENOUS CONTINUOUS
Status: ACTIVE | OUTPATIENT
Start: 2024-11-17 | End: 2024-11-18

## 2024-11-17 RX ORDER — DIPHENHYDRAMINE HCL 25 MG
25 CAPSULE ORAL NIGHTLY PRN
Status: DISCONTINUED | OUTPATIENT
Start: 2024-11-17 | End: 2024-11-18

## 2024-11-17 RX ORDER — AZITHROMYCIN 250 MG/1
500 TABLET, FILM COATED ORAL ONCE
Status: COMPLETED | OUTPATIENT
Start: 2024-11-17 | End: 2024-11-17

## 2024-11-17 RX ORDER — TAMSULOSIN HYDROCHLORIDE 0.4 MG/1
0.4 CAPSULE ORAL 2 TIMES DAILY
Status: DISCONTINUED | OUTPATIENT
Start: 2024-11-17 | End: 2024-11-18

## 2024-11-17 RX ORDER — BICALUTAMIDE 50 MG/1
50 TABLET, FILM COATED ORAL DAILY
Status: DISCONTINUED | OUTPATIENT
Start: 2024-11-17 | End: 2024-11-18

## 2024-11-17 RX ORDER — FLUTICASONE PROPIONATE 50 MCG
2 SPRAY, SUSPENSION (ML) NASAL DAILY
Status: DISCONTINUED | OUTPATIENT
Start: 2024-11-17 | End: 2024-11-18

## 2024-11-17 RX ORDER — ATORVASTATIN CALCIUM 20 MG/1
20 TABLET, FILM COATED ORAL NIGHTLY
Status: DISCONTINUED | OUTPATIENT
Start: 2024-11-17 | End: 2024-11-18

## 2024-11-17 RX ORDER — METOPROLOL TARTRATE 25 MG/1
25 TABLET, FILM COATED ORAL
Status: DISCONTINUED | OUTPATIENT
Start: 2024-11-17 | End: 2024-11-18

## 2024-11-17 RX ORDER — AZITHROMYCIN 250 MG/1
500 TABLET, FILM COATED ORAL
Status: DISCONTINUED | OUTPATIENT
Start: 2024-11-18 | End: 2024-11-18

## 2024-11-17 RX ORDER — DIPHENHYDRAMINE HCL 25 MG
25 CAPSULE ORAL NIGHTLY PRN
COMMUNITY

## 2024-11-17 RX ORDER — SODIUM CHLORIDE 9 MG/ML
1000 INJECTION, SOLUTION INTRAVENOUS ONCE
Status: COMPLETED | OUTPATIENT
Start: 2024-11-17 | End: 2024-11-18

## 2024-11-17 RX ORDER — AZITHROMYCIN 250 MG/1
500 TABLET, FILM COATED ORAL
Status: DISCONTINUED | OUTPATIENT
Start: 2024-11-17 | End: 2024-11-17

## 2024-11-17 NOTE — ED QUICK NOTES
Orders for admission, patient is aware of plan and ready to go upstairs. Any questions, please call ED RN Jose at extension [phone].     Patient Covid vaccination status: Fully vaccinated     COVID Test Ordered in ED: SARS-CoV-2/Flu A and B/RSV by PCR (Gene[employer]pert)    COVID Suspicion at Admission: N/A    Running Infusions:      Mental Status/LOC at time of transport: A&Ox4, ambulatory with walker or one-person assist.     Other pertinent information:   CIWA score: N/A   NIH score:  N/A

## 2024-11-17 NOTE — ED INITIAL ASSESSMENT (HPI)
Patient to the ED via [facility] from home after patient woke up around 0230 when he got up to go to the bathroom. Patient states he started to get weaker since then. Was feeling fine last noc when he went to bed. Patient states he has been eating and drinking okay, no new changes to health or diet. Patient is A&Ox4/4 and uses a walker to get around. Patient endorses two episodes of diarrhea today. No urinary symptoms.

## 2024-11-17 NOTE — ED PROVIDER NOTES
Patient Seen in: [facility] Emergency Department      History     Chief Complaint   Patient presents with    Weakness     Stated Complaint: Weakness since 0230.    Subjective:   HPI      Patient is a pleasant 89-year-old male presented to the emergency room with reports of weakness.  He states he has had a mild cough.  He has not noticed any fevers but he is very weak.  He did get up from use the bathroom a few times but denies any dysuria.  His son who is present with him here reports that he himself had URI symptoms earlier in the week and still had a scratchy throat when he went to go visit his dad earlier in the week.    Objective:     Past Medical History:    Actinic keratitis    Arrhythmia    A-Fib    Aspiration pneumonia, unspecified aspiration pneumonia type, unspecified laterality, unspecified part of lung (HCC)    Binswanger's disease (HCC)    BPH    Cardiomyopathy (HCC)    Exposure to radiation    years ago    High blood pressure    High cholesterol    Hydronephrosis, left    Hydronephrosis, unspecified hydronephrosis type    HYPERLIPIDEMIA    Prostate cancer (HCC)    Secondary hypertension    Septic shock (HCC)    Visual impairment    reading glasses    Wrist osteomyelitis, left (HCC)              Past Surgical History:   Procedure Laterality Date    Cataract      Colectomy      s/p colonoscopy    Colonoscopy  3/2009    complicated by perforated diverticulum    Eye surg ant sgmt proc unlisted Left 2/4/2015    Procedure: LEFT LASER-ASSISTED CATARACT SURGERY WITH PHACOEMULSIFICATION WITH POSTERIOR CHAMBER LENS IMPLANTATION;  Surgeon: Ramsey Archer MD;  Location: [facility]    Eye surg ant sgmt proc unlisted Right 2/18/2015    Procedure: RIGHT LASER-ASSISTED CATARACT SURGERY WITH PHACOEMULSIFICATION WITH POSTERIOR CHAMBER LENS IMPLANTATION;  Surgeon: Ramsey Archer MD;  Location: [facility]    Hand/finger surgery unlisted Left 12/18/21--Dr. Deniz Farah    Incision and  Drainage of of abscess of dorsum of hand    Hernia surgery      Other surgical history      XRT x9wks for CaP    Other surgical history  1/10/17    Cysto-    Remv cataract extracap,insert lens Left 2015    Procedure: LEFT LASER-ASSISTED CATARACT SURGERY WITH PHACOEMULSIFICATION WITH POSTERIOR CHAMBER LENS IMPLANTATION;  Surgeon: Ramsey Archer MD;  [place] [facility]    Remv cataract extracap,insert lens Right 2015    Procedure: RIGHT LASER-ASSISTED CATARACT SURGERY WITH PHACOEMULSIFICATION WITH POSTERIOR CHAMBER LENS IMPLANTATION;  Surgeon: Ramsey Archer MD;  Location: [facility]                Social History     Socioeconomic History    Marital status:    Tobacco Use    Smoking status: Former     Current packs/day: 0.00     Average packs/day: 2.0 packs/day for 20.0 years (40.0 ttl pk-yrs)     Types: Cigars, Cigarettes     Start date: 1946     Quit date: 1966     Years since quittin.8    Smokeless tobacco: Never    Tobacco comments:     quit 40 years ago   Vaping Use    Vaping status: Never Used   Substance and Sexual Activity    Alcohol use: Yes     Comment: 3-4x a week 1-2 drinks    Drug use: No   Other Topics Concern    Seat Belt Yes   Social History Narrative    , retired from [employer]     Social Drivers of Health     Food Insecurity: No Food Insecurity (2024)    Food Insecurity     Food Insecurity: Never true   Transportation Needs: No Transportation Needs (2024)    Transportation Needs     Lack of Transportation: No   Housing Stability: Low Risk  (2024)    Housing Stability     Housing Instability: No                  Physical Exam     ED Triage Vitals   BP 24 1045 151/67   Pulse 24 1037 60   Resp 24 1037 14   Temp 24 1105 98.3 °F (36.8 °C)   Temp src 24 1105 Temporal   SpO2 24 1037 100 %   O2 Device 24 1037 None (Room air)       Current Vitals:   Vital Signs  BP:  112/56  Pulse: 61  Resp: 18  Temp: 97.5 °F (36.4 °C)  Temp src: Oral  MAP (mmHg): 71    Oxygen Therapy  SpO2: 94 %  O2 Device: None (Room air)  O2 Flow Rate (L/min): 0 L/min  Pulse Oximetry Type: Continuous  Oximetry Probe Site Changed: No  Pulse Ox Probe Location: Right hand        Physical Exam  Vitals and nursing note reviewed.   Constitutional:       General: He is not in acute distress.     Appearance: Normal appearance.   HENT:      Head: Normocephalic.      Nose: Nose normal.      Mouth/Throat:      Mouth: Mucous membranes are moist.   Eyes:      Extraocular Movements: Extraocular movements intact.      Pupils: Pupils are equal, round, and reactive to light.   Cardiovascular:      Rate and Rhythm: Normal rate and regular rhythm.      Pulses: Normal pulses.      Heart sounds: Normal heart sounds.   Pulmonary:      Effort: Pulmonary effort is normal.   Abdominal:      General: Abdomen is flat. Bowel sounds are normal. There is no distension.      Palpations: Abdomen is soft.      Tenderness: There is no abdominal tenderness. There is no right CVA tenderness, left CVA tenderness, guarding or rebound.      Hernia: No hernia is present.   Musculoskeletal:         General: No swelling or tenderness. Normal range of motion.      Cervical back: Normal range of motion.   Skin:     General: Skin is warm and dry.   Neurological:      Mental Status: He is alert and oriented to person, place, and time. Mental status is at baseline.   Psychiatric:         Mood and Affect: Mood normal.             ED Course     Labs Reviewed   URINALYSIS WITH CULTURE REFLEX - Abnormal; Notable for the following components:       Result Value    Clarity Urine Turbid (*)     Ketones Urine 10 (*)     Blood Urine 1+ (*)     Protein Urine 30 (*)     Nitrite Urine 1+ (*)     Leukocyte Esterase Urine 75 (*)     WBC Urine 11-20 (*)     RBC Urine 6-10 (*)     Bacteria Urine 1+ (*)     Squamous Epi. Cells Few (*)     All other components within  normal limits   COMP METABOLIC PANEL (14) - Abnormal; Notable for the following components:    BUN 26 (*)     Creatinine 1.79 (*)     eGFR-Cr 36 (*)     Bilirubin, Total 1.0 (*)     All other components within normal limits   LACTIC ACID, PLASMA - Abnormal; Notable for the following components:    Lactic Acid 2.1 (*)     All other components within normal limits   CBC WITH DIFFERENTIAL WITH PLATELET - Abnormal; Notable for the following components:    WBC 3.8 (*)     .0 (*)     All other components within normal limits   PROCALCITONIN - Abnormal; Notable for the following components:    Procalcitonin 0.09 (*)     All other components within normal limits   BASIC METABOLIC PANEL (8) - Abnormal; Notable for the following components:    CO2 20.0 (*)     Creatinine 1.53 (*)     eGFR-Cr 43 (*)     All other components within normal limits   CBC WITH DIFFERENTIAL WITH PLATELET - Abnormal; Notable for the following components:    RBC 3.51 (*)     HGB 11.4 (*)     HCT 33.1 (*)     .0 (*)     All other components within normal limits   LACTIC ACID REFLEX POST POSTIVE - Normal   STREPTOCOCCUS PNEUMONIAE AG, URINE - Normal    Narrative:     Presumptive negative. A negative result does not exclude infection with Streptococcus pneumoniae. The result of this test as well as culture results should be used in conjunction with clinical findings to make an accurate diagnosis.   LEGIONELLA URINE AG SEROGRP 1 - Normal    Narrative:     Presumptive negative for Legionella pneumophila serogroup 1 antigen in urine, suggesting no recent or current infection. Infection due to Legionella cannot be ruled out since other serogroups and species may cause disease, antigen may not be present in early infection, or the level of antigen present in the urine may be below the detection limit of the test.   MAGNESIUM - Normal   SARS-COV-2/FLU A AND B/RSV BY PCR ([employer]) - Normal    Narrative:     This test is intended for the  qualitative detection and differentiation of SARS-CoV-2, influenza A, influenza B, and respiratory syncytial virus (RSV) viral RNA in nasopharyngeal or nares swabs from individuals suspected of respiratory viral infection consistent with COVID-19 by their healthcare provider. Signs and symptoms of respiratory viral infection due to SARS-CoV-2, influenza, and RSV can be similar.    Test performed using the Xpert Xpress SARS-CoV-2/FLU/RSV (real time RT-PCR)  assay on the GeneXpert instrument, [employer], [place] [address].   This test is being used under the Food and Drug Administration's Emergency Use Authorization.    The authorized Fact Sheet for Healthcare Providers for this assay is available upon request from the laboratory.   RAINBOW DRAW LAVENDER   RAINBOW DRAW LIGHT GREEN   RAINBOW DRAW BLUE   RAINBOW DRAW GOLD   URINE CULTURE, ROUTINE   BLOOD CULTURE   BLOOD CULTURE   SPUTUM CULTURE     EKG    Rate, intervals and axes as noted on EKG Report.  Rate: 57  Rhythm: Sinus Rhythm  Reading: RBBB no significant change found         No results found.    XR CHEST AP PORTABLE  (CPT=71045)    Result Date: 11/17/2024  CONCLUSION:  Left lower lobe probable retrocardiac airspace opacity which may reflect pneumonia in the appropriate clinical setting, correlate clinically.   LOCATION:  [facility]      Dictated by (CST): Bam Guaman MD on 11/17/2024 at 11:43 AM     Finalized by (CST): Bam Guaman MD on 11/17/2024 at 11:44 AM                   [facility]    Medical Decision Making    The differential includes the following  Infectious etiology such as urinary tract infection or pneumonia or viral respiratory infection, metabolic derangement, dehydration    Pertinent comorbidities include  As listed above    Pertinent social history includes  As listed above    Labs  Wbc 3.8  UA nitrite positive     Imaging studies  I reviewed the images and my independent interpreation after review is no evidence of pleural effusion or pneumothorax.  Additionaly, I reviewd the radiology report that states the following LLB PNA    External data reviewed    Discussion of management with external providers    ER course  Patient's initial vitals afebrile, HDS. P w/o focal deficits on exam nor is he altered. Work up with evidence of PNA and UTI. Will provide abx and admit.   Disposition and Plan     Clinical Impression:  1. Community acquired pneumonia of left lower lobe of lung    2. Cystitis         Disposition:  Admit  11/17/2024 12:16 pm    Follow-up:  Davis De La Rosa MD  [address] [facility]  [address] [place] [address]  [phone]    Follow up            Medications Prescribed:  Current Discharge Medication List        START taking these medications    Details   cefpodoxime 200 MG Oral Tab Take 1 tablet (200 mg total) by mouth 2 (two) times daily for 5 days.  Qty: 10 tablet, Refills: 0      azithromycin 250 MG Oral Tab Take 1 tablet (250 mg total) by mouth daily for 3 days.  Qty: 3 tablet, Refills: 0                 Supplementary Documentation:         Hospital Problems       Present on Admission  Date Reviewed: 4/5/2022            ICD-10-CM Noted POA    * (Principal) Community acquired pneumonia of left lower lobe of lung J18.9 11/17/2024 Unknown    PNA (pneumonia) J18.9 11/17/2024 Unknown    Pneumonia J18.9 11/17/2024 Unknown

## 2024-11-17 NOTE — PROGRESS NOTES
NURSING ADMISSION NOTE      Patient admitted via Ambulance  Oriented to room.  Safety precautions initiated.  Bed in low position.  Call light in reach.    11/17 AM: Pt is A/O x 4, family at bedside. Lung sounds are clear, on room air. BP WNL, SB on tele, tele notified me of patient having Vent MD Giuliano notified, patient asymptomatic. BM today, voids via urinal. Patient uses walker at home. IV Abx, urine cx pending. Family notified on POC.

## 2024-11-18 VITALS
RESPIRATION RATE: 18 BRPM | WEIGHT: 120 LBS | TEMPERATURE: 98 F | OXYGEN SATURATION: 94 % | SYSTOLIC BLOOD PRESSURE: 112 MMHG | HEART RATE: 61 BPM | DIASTOLIC BLOOD PRESSURE: 56 MMHG | BODY MASS INDEX: 20.49 KG/M2 | HEIGHT: 64 IN

## 2024-11-18 LAB
ANION GAP SERPL CALC-SCNC: 11 MMOL/L (ref 0–18)
BASOPHILS # BLD AUTO: 0.01 X10(3) UL (ref 0–0.2)
BASOPHILS NFR BLD AUTO: 0.2 %
BUN BLD-MCNC: 22 MG/DL (ref 9–23)
CALCIUM BLD-MCNC: 8.9 MG/DL (ref 8.7–10.4)
CHLORIDE SERPL-SCNC: 110 MMOL/L (ref 98–112)
CO2 SERPL-SCNC: 20 MMOL/L (ref 21–32)
CREAT BLD-MCNC: 1.53 MG/DL
EGFRCR SERPLBLD CKD-EPI 2021: 43 ML/MIN/1.73M2 (ref 60–?)
EOSINOPHIL # BLD AUTO: 0.02 X10(3) UL (ref 0–0.7)
EOSINOPHIL NFR BLD AUTO: 0.4 %
ERYTHROCYTE [DISTWIDTH] IN BLOOD BY AUTOMATED COUNT: 13.6 %
GLUCOSE BLD-MCNC: 72 MG/DL (ref 70–99)
HCT VFR BLD AUTO: 33.1 %
HGB BLD-MCNC: 11.4 G/DL
IMM GRANULOCYTES # BLD AUTO: 0.06 X10(3) UL (ref 0–1)
IMM GRANULOCYTES NFR BLD: 1.1 %
L PNEUMO AG UR QL: NEGATIVE
LYMPHOCYTES # BLD AUTO: 1.76 X10(3) UL (ref 1–4)
LYMPHOCYTES NFR BLD AUTO: 33.7 %
MAGNESIUM SERPL-MCNC: 2 MG/DL (ref 1.6–2.6)
MCH RBC QN AUTO: 32.5 PG (ref 26–34)
MCHC RBC AUTO-ENTMCNC: 34.4 G/DL (ref 31–37)
MCV RBC AUTO: 94.3 FL
MONOCYTES # BLD AUTO: 0.74 X10(3) UL (ref 0.1–1)
MONOCYTES NFR BLD AUTO: 14.2 %
NEUTROPHILS # BLD AUTO: 2.63 X10 (3) UL (ref 1.5–7.7)
NEUTROPHILS # BLD AUTO: 2.63 X10(3) UL (ref 1.5–7.7)
NEUTROPHILS NFR BLD AUTO: 50.4 %
OSMOLALITY SERPL CALC.SUM OF ELEC: 294 MOSM/KG (ref 275–295)
PLATELET # BLD AUTO: 130 10(3)UL (ref 150–450)
PLATELETS.RETICULATED NFR BLD AUTO: 3.9 % (ref 0–7)
POTASSIUM SERPL-SCNC: 3.5 MMOL/L (ref 3.5–5.1)
RBC # BLD AUTO: 3.51 X10(6)UL
SODIUM SERPL-SCNC: 141 MMOL/L (ref 136–145)
STREP PNEUMO ANTIGEN, URINE: NEGATIVE
WBC # BLD AUTO: 5.2 X10(3) UL (ref 4–11)

## 2024-11-18 PROCEDURE — 85025 COMPLETE CBC W/AUTO DIFF WBC: CPT

## 2024-11-18 PROCEDURE — 80048 BASIC METABOLIC PNL TOTAL CA: CPT

## 2024-11-18 PROCEDURE — 97161 PT EVAL LOW COMPLEX 20 MIN: CPT

## 2024-11-18 PROCEDURE — 97535 SELF CARE MNGMENT TRAINING: CPT

## 2024-11-18 PROCEDURE — 83735 ASSAY OF MAGNESIUM: CPT

## 2024-11-18 PROCEDURE — 97116 GAIT TRAINING THERAPY: CPT

## 2024-11-18 PROCEDURE — 87449 NOS EACH ORGANISM AG IA: CPT

## 2024-11-18 PROCEDURE — 97165 OT EVAL LOW COMPLEX 30 MIN: CPT

## 2024-11-18 RX ORDER — AZITHROMYCIN 250 MG/1
250 TABLET, FILM COATED ORAL DAILY
Qty: 3 TABLET | Refills: 0 | Status: SHIPPED | OUTPATIENT
Start: 2024-11-19 | End: 2024-11-22

## 2024-11-18 RX ORDER — CEFPODOXIME PROXETIL 200 MG/1
200 TABLET, FILM COATED ORAL 2 TIMES DAILY
Qty: 10 TABLET | Refills: 0 | Status: SHIPPED | OUTPATIENT
Start: 2024-11-19 | End: 2024-11-24

## 2024-11-18 NOTE — PHYSICAL THERAPY NOTE
PHYSICAL THERAPY EVALUATION - INPATIENT     Room Number: 503/503-A  Evaluation Date: 11/18/2024  Type of Evaluation: Initial  Physician Order: PT Eval and Treat    Presenting Problem: PNA  Co-Morbidities : CKD, COPD  Reason for Therapy: Mobility Dysfunction and Discharge Planning    PHYSICAL THERAPY ASSESSMENT   Patient is a 89 year old male admitted 11/17/2024 for PNA.  Prior to admission, patient's baseline is ambulatory with RW.  Patient is currently functioning near baseline with bed mobility, transfers, and gait.  Patient is requiring contact guard assist as a result of the following impairments: decreased functional strength and decreased endurance/aerobic capacity.  Physical Therapy will continue to follow for duration of hospitalization.    Patient will benefit from continued skilled PT Services for duration of hospitalization, however, given the patient is functioning near baseline level do not anticipate skilled therapy needs at discharge .    PLAN DURING HOSPITALIZATION  Nursing Mobility Recommendation : 1 Assist  PT Device Recommendation: Rolling walker  PT Treatment Plan: Gait training;Neuromuscular re-educate;Transfer training;Strengthening  Rehab Potential : Good        CURRENT GOALS    Goal #1    Goal #2    Goal #3 Patient is able to ambulate 150 feet with assist device: walker - rolling at assistance level: supervision     Goal #4    Goal #5    Goal #6    Goal Comments: Goals established on 11/18/2024      PHYSICAL THERAPY MEDICAL/SOCIAL HISTORY  History related to current admission: Patient is a 89 year old male admitted on 11/17/2024 from home for PNA.    HOME SITUATION  Type of Home: House  Home Layout: Stairlift  Stairs to Enter : 1                  Lives With: Spouse               Prior Level of [place]: Pt reports mod ind with RW prior to admit.  Pt reports one recent fall upstairs after consuming alcohol.    SUBJECTIVE  \"I used to run marathons and ultras!\"      OBJECTIVE  Precautions:  Bed/chair alarm  Fall Risk: Standard fall risk    WEIGHT BEARING RESTRICTION     PAIN ASSESSMENT  Ratin          COGNITION  Overall Cognitive Status:  WFL - within functional limits    RANGE OF MOTION AND STRENGTH ASSESSMENT  Upper extremity ROM and strength are within functional limits     Lower extremity ROM is within functional limits     Lower extremity strength is within functional limits     BALANCE  Static Sitting: Good  Dynamic Sitting: Good  Static Standing: Fair -  Dynamic Standing: Fair -    ADDITIONAL TESTS                                    ACTIVITY TOLERANCE                         O2 WALK       NEUROLOGICAL FINDINGS                        AM-PAC '6-Clicks' INPATIENT SHORT FORM - BASIC MOBILITY  How much difficulty does the patient currently have...  Patient Difficulty: Turning over in bed (including adjusting bedclothes, sheets and blankets)?: None   Patient Difficulty: Sitting down on and standing up from a chair with arms (e.g., wheelchair, bedside commode, etc.): None   Patient Difficulty: Moving from lying on back to sitting on the side of the bed?: A Little   How much help from another person does the patient currently need...   Help from Another: Moving to and from a bed to a chair (including a wheelchair)?: A Little   Help from Another: Need to walk in hospital room?: A Little   Help from Another: Climbing 3-5 steps with a railing?: A Little     AM-PAC Score:  Raw Score: 20   Approx Degree of Impairment: 35.83%   Standardized Score (AM-PAC Scale): 47.67   CMS Modifier (G-Code): CJ    FUNCTIONAL ABILITY STATUS  Gait Assessment   Functional Mobility/Gait Assessment  Gait Assistance: Contact guard assist  Distance (ft): 100  Assistive Device: Rolling walker  Pattern: Shuffle (Decreased B knee flexion)    Skilled Therapy Provided     Bed Mobility:  Rolling: ind  Supine to sit: ind   Sit to supine: NT     Transfer Mobility:  Sit to stand: supervision   Stand to sit: supervision  Gait =  Ambulation as above.  Cues to keep RW closer to self at times.    Therapist's Comments: Pt encouraged to be OOB to chair, to ambulate with staff assist.  Anticipate 1-2 additional sessions to meet all goals.      Exercise/Education Provided:  Bed mobility  Body mechanics  Functional activity tolerated  Gait training    Patient End of Session: Up in chair;Needs met;Call light within reach;RN aware of session/findings;All patient questions and concerns addressed;Alarm set;Discussed recommendations with /;In bathroom - nursing staff aware      Patient Evaluation Complexity Level:  History Low - no personal factors and/or co-morbidities   Examination of body systems Low -  addressing 1-2 elements   Clinical Presentation Low- Stable   Clinical Decision Making Low Complexity       PT Session Time: 15 minutes  Gait Trainin minutes

## 2024-11-18 NOTE — DISCHARGE SUMMARY
[facility] Hospitalist Discharge Summary     Patient ID:  Kenn Curtis  89 year old  3/23/1934    Admit date: 11/17/2024    Discharge date and time: 11/18/2024    Attending Physician: Narinder Barajas DO     Primary Care Physician: Davis De La Rosa MD     Discharge Diagnoses:   Community acquired pneumonia   HLD  BPH  A-fib    Please note that only IHP DMG and EMG patients enrolled in the Medicare ACO, [facility] ACO and [facility] HMOs will be handled by the [facility] Care Management team.  For all other patients, please follow usual protocol for discharge care transition.    Discharge Condition: stable    Disposition:  Home    Important Follow up:  - PCP: andrez 3 days   - Consults: None    Follow Up Items:  None    Hospital Course:      89 year old male with PMH of BPH, hypertension, hyperlipidemia, prostate cancer, A-fib on Eliquis who is presenting to the hospital with weakness and cough.      Community-acquired pneumonia  -Presenting with cough and weakness over the last several days.  Differentials include viral pneumonia, bacterial pneumonia.  COVID-negative.  -s/p Rocephin and azithromycin (complete 5 day course),transition to po cefpodoxime 200 mg po BID x 5 more days to complete 7 day course.  -Procalcitonin low, sputum culture pending, Legionella neg, strep pneumo antigen neg  -PT/OT     Hyperlipidemia  -Continue home statin statin     BPH  -Continue home tamsulosin     Atrial fibrillation  -Continue home Eliquis, metoprolol    Stable for discharge home.     Consults: IP CONSULT TO HOSPITALIST    Operative Procedures:  None      Patient instructions:      I as the attending physician reconciled the current and discharge medications on day of discharge.     Current Discharge Medication List        START taking these medications    Details   cefpodoxime 200 MG Oral Tab Take 1 tablet (200 mg total) by mouth 2 (two) times daily for 5 days.       azithromycin 250 MG Oral Tab Take 1 tablet (250 mg total) by mouth daily for 3 days.           CONTINUE these medications which have NOT CHANGED    Details   diphenhydrAMINE 25 MG Oral Cap Take 1 capsule (25 mg total) by mouth nightly as needed for Sleep.      simvastatin 40 MG Oral Tab Take 1 tablet (40 mg total) by mouth nightly.      fluticasone propionate (FLONASE) 50 MCG/ACT Nasal Suspension 2 sprays by Nasal route daily.      bicalutamide 50 MG Oral Tab Take 1 tablet (50 mg total) by mouth daily.      tamsulosin (FLOMAX) cap Take 1 capsule (0.4 mg total) by mouth 2 (two) times daily.      apixaban 2.5 MG Oral Tab Take 1 tablet (2.5 mg total) by mouth 2 (two) times daily.      metoprolol Tartrate 25 MG Oral Tab Take 1 tablet (25 mg total) by mouth 2x Daily(Beta Blocker).      melatonin 3 MG Oral Tab       aspirin 81 MG Oral Tab EC Take 1 tablet (81 mg total) by mouth daily.      Calcium Carbonate-Vitamin D (CALCIUM 500 + D OR) Take 1 tablet by mouth daily.        Ibuprofen-diphenhydrAMINE Cit (ADVIL PM OR) Take by mouth.             Activity: activity as tolerated  Diet: regular diet  Wound Care: as directed  Code Status: Full Code      Exam on day of discharge:     Vitals:    11/18/24 1110   BP: 112/56   Pulse: 61   Resp: 18   Temp: 97.5 °F (36.4 °C)       General: no acute distress, alert and oriented x 3  Heart: RRR  Lungs: Mild L basilar crackles no active wheezing  Abdomen: nontender, nondistended, intact BS  Extremities: no pedal edema   Neuro: CN inact, no focal deficits      Total time coordinating care for discharge: Greater than 30 minutes    Narinder Barajas DO  [facility] Hospitalist

## 2024-11-18 NOTE — PROGRESS NOTES
Patient a&ox3-4, can be forgetful. Wears glasses. , room air. Tele SB. Take eliquis for hx afib. Regular diet. LBM 11/17. Voids via urinal. SBA with RW. PT/OT to eval. IV Rocephin, PO zithro. No further needs at this time, call light within reach.

## 2024-11-18 NOTE — PROGRESS NOTES
OCCUPATIONAL THERAPY EVALUATION - INPATIENT    Room Number: 503/503-A  Evaluation Date: 11/18/2024     Type of Evaluation: Initial  Presenting Problem: Pneumonia    Physician Order: IP Consult to Occupational Therapy  Reason for Therapy:  ADL/IADL Dysfunction and Discharge Planning    OCCUPATIONAL THERAPY ASSESSMENT   Patient is a 89 year old male admitted on 11/17/2024 with Presenting Problem: Pneumonia. Co-Morbidities : COPD  Patient is currently functioning near baseline with toileting, lower body dressing, bed mobility, and transfers.  Prior to admission, patient's baseline is mod I with mobility and Ind with ADLs.  Patient met all OT goals at supervision level.  Patient reports no further questions/concerns at this time.     Patient will benefit from continued skilled OT Services with no additional skilled services but increased support at home    Recommendations for nursing staff:   Transfers: x1 with walker  Toileting location: Toilet    EVALUATION SESSION:  Patient at start of session: bed    FUNCTIONAL TRANSFER ASSESSMENT  Sit to Stand: Edge of Bed  Edge of Bed: Supervision  Toilet Transfer: Supervision    BED MOBILITY  Supine to Sit : Supervision    BALANCE ASSESSMENT     FUNCTIONAL ADL ASSESSMENT  LB Dressing Seated: Supervision  LB Dressing Standing: Supervision  Toileting Seated: Supervision    ACTIVITY TOLERANCE:                          O2 SATURATIONS       COGNITION  Overall Cognitive Status:  WFL - within functional limits    COGNITION ASSESSMENTS     Upper Extremity:   ROM: within functional limits   Strength: is within functional limits     EDUCATION PROVIDED  Patient Education : Role of Occupational Therapy; Discharge Recommendations; Functional Transfer Techniques; Proper Body Mechanics; Fall Prevention  Patient's Response to Education: Verbalized Understanding    Equipment used: walker  Demonstrates functional use    Therapist comments: Pt performs bed mob, toilet tx, LB dressing    Patient End  of Session: Up in chair;Needs met;Call light within reach;Hospital anti-slip socks;Alarm set    OCCUPATIONAL PROFILE    HOME SITUATION  Type of Home: House  Home Layout: Stairlift  Lives With: Spouse    Toilet and Equipment: Standard height toilet;Grab bar  Shower/Tub and Equipment: Walk-in shower;Grab bar                     Prior Level of Function: mod I     SUBJECTIVE  Pt pleasant and cooperative    PAIN ASSESSMENT  Ratin          OBJECTIVE  Precautions: Bed/chair alarm  Fall Risk: Standard fall risk    WEIGHT BEARING RESTRICTION       AM-PAC ‘6-Clicks’ Inpatient Daily Activity Short Form  -   Putting on and taking off regular lower body clothing?: None  -   Bathing (including washing, rinsing, drying)?: A Little  -   Toileting, which includes using toilet, bedpan or urinal? : A Little  -   Putting on and taking off regular upper body clothing?: None  -   Taking care of personal grooming such as brushing teeth?: None  -   Eating meals?: None    AM-PAC Score:  Score: 22  Approx Degree of Impairment: 25.8%  Standardized Score (AM-PAC Scale): 47.1    ADDITIONAL TESTS     NEUROLOGICAL FINDINGS      PLAN   Patient has been evaluated and presents with no skilled Occupational Therapy needs at this time.  Patient discharged from Occupational Therapy services.  Please re-order if a new functional limitation presents during this admission.         Patient Evaluation Complexity Level:   Occupational Profile/Medical History LOW - Brief history including review of medical or therapy records    Specific performance deficits impacting engagement in ADL/IADL LOW  1 - 3 performance deficits    Client Assessment/Performance Deficits LOW - No comorbidities nor modifications of tasks    Clinical Decision Making LOW - Analysis of occupational profile, problem-focused assessments, limited treatment options    Overall Complexity LOW     OT Session Time: 25 minutes  Self-Care Home Management: 15 minutes

## 2024-11-18 NOTE — H&P
[facility] Hospitalist H&P       CC:   Chief Complaint   Patient presents with    Weakness        PCP: Davis De La Rosa MD    History of Present Illness: Patient is a 89 year old male with PMH of BPH, hypertension, hyperlipidemia, prostate cancer, A-fib on Eliquis who is presenting to the hospital with weakness and cough.  Patient notes that over the last 1-2 days, he is endorsed feeling increasingly weak and notes decreased oral intake.  He notes that he has been having a mild cough as well, endorses some yellow/brown sputum production.  Denies any fevers, chills, chest pain, shortness of breath, nausea, vomiting.  He notes that he did have an episode last night of several episodes of loose stools however has since resolved.  Given weakness, he came to the ER for further evaluation.    On arrival,  Vital signs stable.  White count decreased to 3.8, otherwise CBC is unremarkable.  CMP shows creatinine of 1.79 (baseline 1.5).  Urinalysis does show +1 nitrites and 75 leukocyte Estrace however patient denies any urinary symptoms.  Chest x-ray does show a left lower lobe opacity.  Admitted for pneumonia.  Patient started on Rocephin and azithromycin,    PMH  Past Medical History:    Actinic keratitis    Arrhythmia    A-Fib    Aspiration pneumonia, unspecified aspiration pneumonia type, unspecified laterality, unspecified part of lung (HCC)    Binswanger's disease (HCC)    BPH    Cardiomyopathy (HCC)    Exposure to radiation    years ago    High blood pressure    High cholesterol    Hydronephrosis, left    Hydronephrosis, unspecified hydronephrosis type    HYPERLIPIDEMIA    Prostate cancer (HCC)    Secondary hypertension    Septic shock (HCC)    Visual impairment    reading glasses    Wrist osteomyelitis, left (HCC)        PSH  Past Surgical History:   Procedure Laterality Date    Cataract      Colectomy      s/p colonoscopy    Colonoscopy  3/2009    complicated by perforated diverticulum    Eye surg ant sgmt proc  unlisted Left 2015    Procedure: LEFT LASER-ASSISTED CATARACT SURGERY WITH PHACOEMULSIFICATION WITH POSTERIOR CHAMBER LENS IMPLANTATION;  Surgeon: Ramsey Archer MD;  Location: [facility]    Eye surg ant sgmt proc unlisted Right 2015    Procedure: RIGHT LASER-ASSISTED CATARACT SURGERY WITH PHACOEMULSIFICATION WITH POSTERIOR CHAMBER LENS IMPLANTATION;  Surgeon: Ramsey Archer MD;  Location: [facility]    Hand/finger surgery unlisted Left 21--Dr. Deniz Farah    Incision and Drainage of of abscess of dorsum of hand    Hernia surgery      Other surgical history      XRT x9wks for CaP    Other surgical history  1/10/17    Cysto-    Remv cataract extracap,insert lens Left 2015    Procedure: LEFT LASER-ASSISTED CATARACT SURGERY WITH PHACOEMULSIFICATION WITH POSTERIOR CHAMBER LENS IMPLANTATION;  Surgeon: Ramsey Archer MD;  Location: [facility]    Remv cataract extracap,insert lens Right 2015    Procedure: RIGHT LASER-ASSISTED CATARACT SURGERY WITH PHACOEMULSIFICATION WITH POSTERIOR CHAMBER LENS IMPLANTATION;  Surgeon: Ramsey Archer MD;  Location: [facility]        ALL:  Allergies[1]     Home Medications:  Medications Taking[2]      Soc Hx  Social History     Tobacco Use    Smoking status: Former     Current packs/day: 0.00     Average packs/day: 2.0 packs/day for 20.0 years (40.0 ttl pk-yrs)     Types: Cigars, Cigarettes     Start date: 1946     Quit date: 1966     Years since quittin.8    Smokeless tobacco: Never    Tobacco comments:     quit 40 years ago   Substance Use Topics    Alcohol use: Yes     Comment: 3-4x a week 1-2 drinks        Fam Hx  Family History   Problem Relation Age of Onset    Cancer Father         brain cancer,  at 62 yo    Cancer Mother         leukemia,  at 85 yo       Review of Systems  Comprehensive ROS reviewed and negative except for what's stated above.     OBJECTIVE:  BP  132/71 (BP Location: Left arm)   Pulse 57   Temp 97.9 °F (36.6 °C) (Oral)   Resp 18   Ht 5' 4\" (1.626 m)   Wt 120 lb (54.4 kg)   SpO2 96%   BMI 20.60 kg/m²   General:  Alert, no acute distress.  Generally appears dry.   Head:  Normocephalic, without obvious abnormality, atraumatic.   Eyes:  Sclera anicteric, No conjunctival pallor, EOMs intact. PERRLA.   Nose: Nares normal. Septum midline. Mucosa normal. No drainage.   Throat: Lips, mucosa, and tongue normal. Teeth and gums normal.   Neck: Supple, symmetrical, trachea midline, no cervical or supraclavicular lymph adenopathy, thyroid: no enlargment/tenderness/nodules appreciated   Lungs:   Clear to auscultation bilaterally. Normal respiratory effort   Chest wall:  No tenderness or deformity.   Heart:  Regular rate and rhythm, S1, S2 normal, no murmur, rub or gallop appreciated   Abdomen:   Soft, non-tender. Bowel sounds normal. No masses,  No organomegaly. Non distended   Extremities: Extremities normal, atraumatic, no cyanosis or edema.   Skin: Skin color, texture, turgor normal. No rashes or lesions.    Neurologic:    Psych:     AOx3, no focal neurologic deficits, normal strength, CN II-XII grossly intact  Appropriate mood and affect       Diagnostic Data:    CBC/Chem  Recent Labs   Lab 11/17/24  1054   WBC 3.8*   HGB 13.0   MCV 95.4   .0*       Recent Labs   Lab 11/17/24  1054      K 3.6      CO2 23.0   BUN 26*   CREATSERUM 1.79*   GLU 90   CA 10.1       Recent Labs   Lab 11/17/24  1054   ALT 19   AST 26   ALB 4.5       No results for input(s): \"TROP\" in the last 168 hours.    Additional Diagnostics: ECG: Independently reviewed and interpreted, shows sinus bradycardia with no ST/T wave abnormalities.    CXR: image personally reviewed and interpreted, does show left lower airspace opacity.    Radiology: XR CHEST AP PORTABLE  (CPT=71045)    Result Date: 11/17/2024  CONCLUSION:  Left lower lobe probable retrocardiac airspace opacity which  may reflect pneumonia in the appropriate clinical setting, correlate clinically.   LOCATION:  [facility]      Dictated by (CST): Bam Guaman MD on 11/17/2024 at 11:43 AM     Finalized by (CST): Bam Guaman MD on 11/17/2024 at 11:44 AM          ASSESSMENT / PLAN:     89 year old male with PMH of BPH, hypertension, hyperlipidemia, prostate cancer, A-fib on Eliquis who is presenting to the hospital with weakness and cough.     Community-acquired pneumonia  -Presenting with cough and weakness over the last several days.  Differentials include viral pneumonia, bacterial pneumonia.  COVID-negative.  -Rocephin and azithromycin  -Procalcitonin, sputum culture, Legionella, strep pneumo antigen pending  -PT/OT  -Will monitor overnight, can likely discharge tomorrow pending clinical improvement    Hyperlipidemia  -Continue home statin statin    BPH  -Continue home tamsulosin    Atrial fibrillation  -Continue home Eliquis, metoprolol    IVF: Normal saline  Diet: Regular  DVT Prophylaxis: Eliquis  Dispo: Discharge tomorrow pending PT/OT and clinical improvement.    Outpatient records or previous hospital records reviewed.   Questions/concerns were discussed with patient and/or family by bedside.  [facility] hospitalist to continue to follow patient while in house    [facility]  Hospitalist  Contact via [employer]      Advanced Care Planning    While discussing goals of care with the patient and their family, patient voluntarily participated in an advanced care planning discussion. The following was discussed: patient is full code..    16 Minutes were spent in discussing advanced care planning. This time was exclusive of the documented time for this visit.         [1]   Allergies  Allergen Reactions    Amoxicillin RASH   [2]   Outpatient Medications Marked as Taking for the 11/17/24 encounter (Hospital Encounter)   Medication Sig Dispense Refill    diphenhydrAMINE 25 MG Oral Cap Take 1 capsule  (25 mg total) by mouth nightly as needed for Sleep.      simvastatin 40 MG Oral Tab Take 1 tablet (40 mg total) by mouth nightly. 90 tablet 3    fluticasone propionate (FLONASE) 50 MCG/ACT Nasal Suspension 2 sprays by Nasal route daily. 3 each 3    bicalutamide 50 MG Oral Tab Take 1 tablet (50 mg total) by mouth daily. 90 tablet 3    tamsulosin (FLOMAX) cap Take 1 capsule (0.4 mg total) by mouth 2 (two) times daily. 180 capsule 3    apixaban 2.5 MG Oral Tab Take 1 tablet (2.5 mg total) by mouth 2 (two) times daily. 180 tablet 3    metoprolol Tartrate 25 MG Oral Tab Take 1 tablet (25 mg total) by mouth 2x Daily(Beta Blocker). 180 tablet 3    melatonin 3 MG Oral Tab  30 tablet 0    aspirin 81 MG Oral Tab EC Take 1 tablet (81 mg total) by mouth daily.      Calcium Carbonate-Vitamin D (CALCIUM 500 + D OR) Take 1 tablet by mouth daily.

## 2024-11-18 NOTE — CM/SW NOTE
11/18/24 1200   Discharge disposition   Expected discharge disposition Home or Self   Discharge transportation Private car     Pt discussed in rounds and no discharge needs identified.    Per PT note:     HOME SITUATION  Type of Home: House  Home Layout: Stairlift  Stairs to Enter : 1    Lives With: Spouse     Prior Level of [place]: Pt reports mod ind with RW prior to admit.  Pt reports one recent fall upstairs after consuming alcohol.    / to remain available for support and/or discharge planning.         Najma Whitt MBA MSN, RN CTL/  [id number]

## (undated) DEVICE — SOLUTION IRRIG 3000ML 0.9% NACL FLX CONT

## (undated) DEVICE — CATH URET CONE TIP 8FR 138008

## (undated) DEVICE — ZIPWIRE GUIDEWIRE .038X150 ANG

## (undated) DEVICE — SINGLE-USE DIGITAL FLEXIBLE URETEROSCOPE: Brand: LITHOVUE

## (undated) DEVICE — NITINOL STONE RETRIEVAL BASKET: Brand: ZERO TIP

## (undated) DEVICE — SCD SLEEVE KNEE HI BLEND

## (undated) DEVICE — TIGERTAIL 5F FLXTIP 70CM

## (undated) DEVICE — FIBER LSR 200UM 2J 80HZ 60W DL FOR LITHO

## (undated) DEVICE — SPONGE STICK WITH PVP-I: Brand: KENDALL

## (undated) DEVICE — ZIPWIRE GUIDEWIRE .038X150 STR

## (undated) DEVICE — CYSTO CDS-LF: Brand: MEDLINE INDUSTRIES, INC.

## (undated) DEVICE — BANDAGE ROLL,100% COTTON, 6 PLY, LARGE: Brand: KERLIX

## (undated) DEVICE — Device

## (undated) DEVICE — SLEEVE COMPR M KNEE LEN SGL USE KENDALL SCD

## (undated) DEVICE — HYDROGEL COATED URETERAL DILATOR: Brand: NOTTINGHAM ONE-STEP

## (undated) DEVICE — GLOVE SURG SENSICARE SZ 7-1/2

## (undated) DEVICE — SKN PREP SPNG STKS PVP PNT STR: Brand: MEDLINE INDUSTRIES, INC.

## (undated) DEVICE — SEAL BIOPSY PORT ACMI

## (undated) DEVICE — STERILE POLYISOPRENE POWDER-FREE SURGICAL GLOVES: Brand: PROTEXIS

## (undated) DEVICE — OPEN-END FLEXI-TIP URETERAL CATHETER: Brand: FLEXI-TIP

## (undated) DEVICE — SOL H2O 1000ML BTL

## (undated) DEVICE — DISPOSABLE TOURNIQUET CUFF SINGLE BLADDER, DUAL PORT AND QUICK CONNECT CONNECTOR: Brand: COLOR CUFF

## (undated) DEVICE — GLOVE SURG TRIUMPH SZ 8

## (undated) DEVICE — SOL  .9 3000ML

## (undated) DEVICE — SUPRAPUBIC PROCENDURAL TRAY

## (undated) DEVICE — CATH FOLEY COUNCIL 16FR 5CC

## (undated) DEVICE — GAUZE,PACKING STRIP,PLAIN,1/2"X5YD,STRL: Brand: CURAD

## (undated) DEVICE — GLOVE SURG SENSICARE SZ 8

## (undated) DEVICE — KENDALL SCD EXPRESS SLEEVES, KNEE LENGTH, MEDIUM: Brand: KENDALL SCD

## (undated) DEVICE — SOL  .9 1000ML BTL

## (undated) DEVICE — CONRAY 60% VI

## (undated) DEVICE — CONVERTORS STOCKINETTE: Brand: CONVERTORS

## (undated) DEVICE — ZIPWIRE GUIDE .035X150 STR/STF

## (undated) DEVICE — GOWN,SIRUS,FABRIC-REINFORCED,X-LARGE: Brand: MEDLINE

## (undated) DEVICE — GAMMEX® NON-LATEX PI TEXTURED SIZE 7.5, STERILE POLYISOPRENE POWDER-FREE SURGICAL GLOVE: Brand: GAMMEX

## (undated) DEVICE — UPPER EXTREMITY CDS-LF: Brand: MEDLINE INDUSTRIES, INC.

## (undated) NOTE — LETTER
Aubree Maher [address]  [place], [address] [place] [address]    Consent for Operation  Date: __________________                                Time: _______________    1.  I authorize the performance upon Ilene Mackenzie the following operation:  Procedure revealed by the pictures or by descriptive texts accompanying them. If the procedure has been videotaped, the surgeon will obtain the original videotape. The hospital will not be responsible for storage or maintenance of this tape.   7. For the purpose of a THAT MY DOCTOR PROVIDED ME WITH THE ABOVE EXPLANATIONS, THAT ALL BLANKS OR STATEMENTS REQUIRING INSERTION OR COMPLETION WERE FILLED IN.     Signature of Patient:   ___________________________    When the patient is a minor or mentally incompetent to give co iii. All of the medicines I take (including prescriptions, herbal supplements, and pills I can buy without a prescription (including street drugs/illegal medications).  Failure to inform my anesthesiologist about these medicines may increase my risk of anes _____________________________________________________________________________  Anesthesiologist Signature     Date   Time  I have discussed the procedure and information above with the patient (or patient’s representative) and answered their questions.  The

## (undated) NOTE — LETTER
Aubree Maher 182 6 13[facility]  [place]va, [address] [place] [address]    Consent for Operation  Date: __________________                                Time: _______________    1.  I authorize the performance upon Tammie Dias the following operation:  Procedure procedure has been videotaped, the surgeon will obtain the original videotape. The hospital will not be responsible for storage or maintenance of this tape.   7. For the purpose of advancing medical education, I consent to the admittance of observers to the STATEMENTS REQUIRING INSERTION OR COMPLETION WERE FILLED IN.     Signature of Patient:   ___________________________    When the patient is a minor or mentally incompetent to give consent:  Signature of person authorized to consent for patient: ____________ supplements, and pills I can buy without a prescription (including street drugs/illegal medications). Failure to inform my anesthesiologist about these medicines may increase my risk of anesthetic complications. iv.  If I am allergic to anything or have ha Anesthesiologist Signature     Date   Time  I have discussed the procedure and information above with the patient (or patient’s representative) and answered their questions. The patient or their representative has agreed to have anesthesia services.     ___

## (undated) NOTE — IP AVS SNAPSHOT
Patient Demographics     Address  [id number] [place] [id number] Phone  [phone] (Home) *Preferred*  [phone] [facility]) E-mail Address  [email or web address]      Emergency Contact(s)     Name Relation Home Work Mobile    Michael Curtis ? Empty the drainage bag when it is ½ to 1/3 full. The drainage bag must always be to gravity drainage and must be below the level of your bladder. The bag should never be left lying on the floor.   ? Drainage bags should be cleaned each time you switch f ? If you have persistent leaking around the catheter.            Follow-up Information     Karon Meraz MD.    Specialties: UROLOGY, Lithotripsy  Why: urology follow-up after discharge  Contact information:  [address]  [address] [place] [address] [address] [address] Next dose due: 12/3PM dose      Take 1 tablet (25 mg total) by mouth 2x Daily(Beta Blocker). DENTON Marques         simvastatin 40 MG Tabs  Commonly known as: ZOCOR  Next dose due: 12/2PM      Take 1 tablet (40 mg total) by mouth nightly.    Chente Bold Patient's Most Recent Weight       Most Recent Value   Patient Weight  52.7 kg (116 lb 2.9 oz)         Lab Results Last 24 Hours      CBC WITH DIFFERENTIAL WITH PLATELET [[id number]] (Abnormal)  Resulted: 12/02/20 0655, Result status: Final result   Orderin Basophil % Manual 1 % — [facility])   [facility]elocyte % [facility]   Myelocyte % 2 % — [facility])   Total Cells Counted 100 — — [facility] ([facility])   RBC Morphology See morphology below Normal, Slide reviewed, see previous RBC morphology. Lab - Abbreviation Name Director Address Valid Date Range    [address] [facility]) [facility]) Gordon Rodriguez  [address] [phone] 03/19/20 1441 - Present            Microbiology Results (Al : Hayes Meng DO (Physician)       [facility] Hospitalist History and Physical[MS.1]      Patient presents with:  Difficulty Breathing[MS. 2]       PCP:[MS.1] Yaya Escamilla MD[MS.2]      History of Present Illness: Patient is a[MS.3 80year old[MS. 2] Performed by Vivian Scott MD at [facility] MAIN OR   • CYSTOSCOPY URETEROSCOPY N/A 11/6/2020    Performed by Denice Turner MD at [facility] MAIN OR   • CYSTOSCOPY URETEROSCOPY Left 2/3/2017    Performed by Denice Turner MD at [address] [place] [address] •  Calcium Carbonate-Vitamin D (CALCIUM 500 + D OR), Take 1 tablet by mouth daily.   , Disp: , Rfl:[MS.4]           Social History    Tobacco Use      Smoking status: Former Smoker        Packs/day: 2.00        Years: 20.00        Pack years: 40        Type PTP 16.1 11/24/2020     11/24/2020    DDIMER 7.35 11/24/2020    MG 2.7 11/25/2020    PHOS 4.5 11/25/2020    TROP <0.045 11/24/2020     11/24/2020     11/24/2020    PGLU 221 11/24/2020[MS. 2]       CXR: image personally reviewed.       Rad DATE OF SERVICE: 11.19.2020 XR RIBS, UNILATERAL (2 VIEWS), RIGHT (CPT=71100), XR CHEST PA + LAT CHEST (UUD=25101) CLINICAL INFORMATION: Fall down stairs, initial encounter. COMPARISON STUDY: None. TECHNIQUE: AP & oblique ribs; 2 rib views.  PA and lateral, PROCEDURE:  CT BRAIN OR HEAD (99324)  COMPARISON:  None. INDICATIONS:  fall, sob,  TECHNIQUE:  Noncontrast CT scanning is performed through the brain. Dose reduction techniques were used.  Dose information is transmitted to the [address] PROCEDURE:  CT CHEST+ABDOMEN+PELVIS(CPT=71250/52232)  COMPARISON:  [facility] , CT, CT CHEST (CPT=71250), 12/01/2019, 5:43 PM.  INDICATIONS:  fall, sob,  TECHNIQUE:  Following oral contrast administration, unenhanced multislice CT scanning is performed through calcifications are noted. RETROPERITONEUM:  No mass or adenopathy. BOWEL/MESENTERY:  Diverticulosis of the colon without evidence of acute diverticulitis. ABDOMINAL WALL:  No mass or hernia.   URINARY BLADDER:  Mild to moderate bladder wall thickening is n PROCEDURE:  XR CHEST AP PORTABLE  (CPT=71045)  TECHNIQUE:  AP chest radiograph was obtained.   COMPARISON:  [facility] , XR, XR CHEST AP PORTABLE  (CPT=71045), 12/04/2019, 11:20 AM.  INDICATIONS:  fall, sob,  PATIENT STATED HISTORY: (As transcribed by Shruti 80 yr old male with PMH sig for BPH, CKD, and prostate CA s/p EBRT c/b urethral stricture s/p dilation 12/2019 followed by repeat dilation 19 days ago who presented to the ED for further evaluation of weakness and falls.     # Acute hypoxic respiratory fail Electronically signed by Miguel Jeffers DO on 11/25/2020 10:49 AM   [employer]    MS. 1 - Miguel Jeffers, DO on 11/25/2020 10:10 AM  MS. 2 - Miguel Jeffers, DO on 11/25/2020 10:11 AM  MS. 3 - Miguel Jeffers, DO on 11/25/2020 10:43 AM  MS • Visual impairment     reading glasses     Past Surgical History:   Procedure Laterality Date   • CATARACT     • COLECTOMY      s/p colonoscopy   • COLONOSCOPY  1/2106    complicated by perforated diverticulum   • CYSTO, PERCUTANEOUS INSERTION SUPRA-PUBIC •  atorvastatin (LIPITOR) tab 20 mg, 20 mg, Oral, Nightly  •  Meropenem (MERREM) 500 mg in sodium chloride 0.9% 100 mL MBP, 500 mg, Intravenous, Q24H  No current outpatient medications on file. [KD.2]        PHYSICAL EXAM:     Vital Signs:[KD.1] /57 ( RDW 13.9 11/25/2020    NEPRELIM 24.67 (H) 11/24/2020    NEUTABS 26.10 (H) 11/24/2020    LYMPHABS 1.80 11/24/2020    EOSABS 0.00 11/24/2020    BASABS 0.00 11/24/2020    NEUT 81 11/24/2020    LYMPH 6 11/24/2020    MON 5 11/24/2020    EOS 0 11/24/2020    BAS CONCLUSION:   1. Examination is limited by motion artifact. 2. Marked consolidation in the left lower lobe along with moderate consolidation the right lower lobe is noted. Scattered reticular densities are noted. This may be due to infectious process. Sepsis -- due to urinary source and PNA. Abx. ICU on consult     Acute hypoxic resp failure -- asp pna. on o2. pulm on consult.      Hypoalbuminemia -- encourage po intake    Anemia -- transfusion prn    Thank you for allowing me to participate in the care History of Present Illness: 80 yr old male with PMH sig for BPH, CKD, and prostate CA s/p EBRT c/b urethral stricture s/p dilation 12/2019 followed by repeat dilation 19 days ago who presented to the ED for further evaluation of weakness and falls.  Patient CHANGE how you take these medications      Instructions Prescription details   bicalutamide 50 MG Tabs  Commonly known as: CASODEX  Start taking on: December 9, 2020  What changed: These instructions start on December 9, 2020.  If you are unsure what to do [phone]      urology follow-up after discharge    Rubi Silva, [address] [facility] [address] [facility] [address] [phone]      On discharge from rehab     Appointments for Next 30 Days 12/2/2020 - 1/1/2021      Date Arrival Time Visit Type Rhona 80 yr old male with PMH sig for BPH, CKD, and prostate CA s/p EBRT c/b urethral stricture s/p dilation 12/2019 followed by repeat dilation 19 days ago who presented to the ED for further evaluation of weakness and falls.     # Acute hypoxic respiratory gary PATIENT DISCHARGE INSTRUCTIONS: See electronic chart    Time spent:  > 35 minutes[MS.1]      Electronically signed by Alanna Oropeza MD on 12/2/2020  9:00 AM   [employer]    MS. 1 - Alanna Oropeza MD on 12/2/2020  8:52 AM                        Physi • EXCISION OF ARM MASS Right 9/19/2017    Performed by Divine Brower MD at [facility]   • HERNIA SURGERY     • LEFT LASER-ASSISTED CATARACT SURGERY WITH PHACOEMULSIFICATION WITH POSTERIOR CHAMBER LENS IMPLANTATION Left 2/4/2015    Performe -   Moving to and from a bed to a chair (including a wheelchair)?: A Little   -   Need to walk in hospital room?: A Little   -   Climbing 3-5 steps with a railing?: Total       AM-PAC Score:  Raw Score: 16   Approx Degree of Impairment: 54.16%   Standardiz Skilled Therapy Provided: On the recliner and is eager to participate in PT. Son in room, called dtr and face time during session . Sit<>stand with min A needs cueing with appropriate hand placement and body mechanics.  Gt training with min A unsteady limi Cont to be well below from his baseline functional skills with poor task tolerance , endurance limited. Is unsteady and continues to be a high risk of fall requiring min/mod A in all aspect of his mobilities.   He was able to ambulate with out O2, and whil Occupational Therapy Notes (last 72 hours) (Notes from 11/29/2020 11:27 AM through 12/2/2020 11:27 AM)      Occupational Therapy Note signed by Millie Galeana OT at 11/29/2020  5:00 PM  Version 1 of 1    Author: Millie Galeana OT Service: Re • CYSTO, PERCUTANEOUS INSERTION SUPRA-PUBIC CATHETER N/A 12/2/2019    Performed by Mary Vu MD at [facility] MAIN OR   • CYSTOSCOPY URETEROSCOPY N/A 11/6/2020    Performed by Eugenia Hoff MD at [facility] MAIN OR   • CYSTOSCOPY URETEROSCOPY Left 2/3/2017    Perfor Current Vision: no visual deficits    PERCEPTION  Overall Perception Status:   WFL - within functional limits    SENSATION  Light touch:  intact    Communication: Pt is able to communicate all basic needs and wants    Behavioral/Emotional/Social: Pt was pa Patient is a 80year old male admitted on 11/24/2020 for fall. Complete medical history and occupational profile noted above. Functional outcome measures completed include AMPAC, MMT, ROM.  In this OT evaluation patient presents with the following performan Patient will transfer from supine to sit:  with supervision  Patient will transfer from sit to stand:  with supervision  Patient will transfer to toilet:  with supervision    UE Exercise Program Goal  Patient will be supervision with bilateral AROM HEP (ho Interdisciplinary Communication: Discussed with RN            GOALS  Goal #1 The patient will tolerate mech soft chopped consistency and thin liquids without overt signs or symptoms of aspiration with 100 % accuracy over 1 session(s).   In Progress   Goal # Leuprolide Acetate, 7.5mg, Im Inj 03/28/11     Leuprolide Acetate, 7.5mg, Im Inj 07/26/10     Pneumococcal (Prevnar 13) 07/02/15     Pneumovax 23 09/29/16       Future Appointments        Provider Adonis Thomas    12/15/2020 11:15 AM Marylene Stai,

## (undated) NOTE — LETTER
Aubree Maher 182  [address] [facility] [address], [address] [facility]  Authorization for Surgical Operation and Procedure     Date:___________                                                                                                         Time:__________ that can occur: fever and allergic reactions, hemolytic reactions, transmission of diseases such as Hepatitis, AIDS and Cytomegalovirus (CMV) and fluid overload.   In the event that I wish to have an autologous transfusion of my own blood, or a directed don when the applicable recovery period ends for purposes of reinstating the DNAR order.   10. Patients having a sterilization procedure: I understand that if the procedure is successful the results will be permanent and it will therefore be impossible for me t doctor) to give me medicine and do additional procedures as necessary.  Some examples are: Starting or using an “IV” to give me medicine, fluids or blood during my procedure, and having a breathing tube placed to help me breathe when I’m asleep (intubation) understand that rare but potential complications include headache, bleeding, infection, seizure, irregular heart rhythms, and nerve injury.     I can change my mind about having anesthesia services at any time before I get the medicine.    _________________

## (undated) NOTE — IP AVS SNAPSHOT
[address]            (For Outpatient Use Only) Initial Admit Date: 11/24/2020   Inpt/Obs Admit Date: Inpt: 11/24/20 / Obs: N/A   Discharge Date:    Gaston Louis:  [de-identified]   MRN: [de-identified]   CSN: [id number]   CEID: [id number] Subscriber ID:  Pt Rel to Subscriber:    Hospital Account Financial Class: Medicare Advantage    December 2, 2020

## (undated) NOTE — LETTER
[facility] Testing Department  Phone: [phone]  Right Fax: [phone]  EVALUATION REQUEST PREOP    Sent By:  Adair Sun Rn Date: 10/26/23    Patient Name: Dorys Casillas  Surgery Date: 2023    CSN: [id number]  Medical Record: [id number]   : 3/23/1934 - A: 80 y      Sex: male    Surgeon(s):  Brent Juarez MD    Procedure: CYSTOSCOPY, LEFT URETEROSCOPY, LEFT RETOGRADE PYELOGRAM, BASKET STONE EXTRACTION, POSSIBLE URETERAL STENT PLACEMENT, HOLMIUM LASER LITHOTRIPSY. , Left    Procedure Comments: CYSTOSCOPY, LEFT URETEROSCOPY, LEFT RETOGRADE PYELOGRAM, BASKET STONE EXTRACTION, POSSIBLE URETERAL STENT PLACEMENT, HOLMIUM LASER LITHOTRIPSY. Anesthesia Type: General      To Dr. Russell Key  Fax #: [phone]    [address] 1 PAGES (INCLUDING COVER SHEET)    ANESTHESIOLOGIST  HAS REQUESTED THE FOLLOWING:  NOTE OF CARDIAC CLEARANCE-Cardiomyopathy history/abnormal Ekg  _______________________________________________________________  Please note the following attached testing results for your review:   None    [address] [facility], [address] [facility]  Phone: [phone]  Fax: [phone]  [email or web address]    STATEMENT OF CONFIDENTIALITY: This transmittal is intended only for the use of the individual entity to which is addressed and may contain information that is privileged and confidential. information contained. If the reader of this message IS NOT the intended recipient, you are hereby notified that any disclosure, distribution or copying of this information is strictly prohibited. If you have received this transmission in error, please notify us immediately by telephone and return the original documents to us at the above address via the [facility]. Thank you.

## (undated) NOTE — LETTER
Patient Name: Melrose Dubin 3/23/1934-A: 80 y Sex: male   MRN: [id number] CSN: [id number]      ANTIBIOTIC ALLERGY/SENSITIVITY/CONTRAINDICATION  Surgery Date:  10/25/2023  Procedure: CYSTOSCOPY, LEFT URETEROSCOPY, LEFT RETOGRADE PYELOGRAM, BASKET STONE EXTRACTION, POSSIBLE URETERAL STENT PLACEMENT, HOLMIUM LASER LITHOTRIPSY. Anesthesia Type: General  Surgeon(s):  Mario Weathers MD    Allergy Reaction: Contraindication to Ancef due to patient's allergy to Amoxicillin, reaction noted rash. The above patient has an allergy/sensitivity or contraindication to the antibiotic ordered from your standing orders/[facility] Pre-op Standing orders/[facility] Adult Preoperative Prophylactic Protocol listed below. If you would like the medication given, please fax this form back indicating the override reason with a physician signature/date/and time.      ___  Benefit outweighs risk ___  Insignificant ___  Low risk      ___ Not a true allergy  ___  Dose appropriate ___  Tolerated regimen in the past   If you prefer to order an alternate antibiotic please list drug, dose, route and time to administer below:     _____________________________________  _______    ___________   __________________  Antibiotic                                                                                        Dose                 Route                        Time of administration    ________________________________________Date____________Time________  (MD signature)  Fax this form back to [phone]

## (undated) NOTE — LETTER
Aubree Maher 182  [address] [facility] [address], [address] [facility]  Authorization for Surgical Operation and Procedure     Date:___________                                                                                                         Time:__________ that can occur: fever and allergic reactions, hemolytic reactions, transmission of diseases such as Hepatitis, AIDS and Cytomegalovirus (CMV) and fluid overload.   In the event that I wish to have an autologous transfusion of my own blood, or a directed don when the applicable recovery period ends for purposes of reinstating the DNAR order.   10. Patients having a sterilization procedure: I understand that if the procedure is successful the results will be permanent and it will therefore be impossible for me t doctor) to give me medicine and do additional procedures as necessary.  Some examples are: Starting or using an “IV” to give me medicine, fluids or blood during my procedure, and having a breathing tube placed to help me breathe when I’m asleep (intubation) understand that rare but potential complications include headache, bleeding, infection, seizure, irregular heart rhythms, and nerve injury.     I can change my mind about having anesthesia services at any time before I get the medicine.    _________________

## (undated) NOTE — LETTER
Patient Name: Onesimo Blind  Surgery Date: 11/1/2023  Medical Record: [id number] CSN: [id number]      Surgeon(s):  Crissy Duckworth MD  Consent Procedure: CYSTOSCOPY, LEFT URETEROSCOPY, LEFT RETOGRADE PYELOGRAM, BASKET STONE EXTRACTION, POSSIBLE URETERAL STENT PLACEMENT, HOLMIUM LASER LITHOTRIPSY. Anesthesia Type: General    FYI: Anesthesia has requested cardiac clearance pre op. Cardiologist/ office has been called and request also faxed.    Thanks, P.A.T. dept